# Patient Record
Sex: MALE | Race: WHITE | Employment: FULL TIME | URBAN - METROPOLITAN AREA
[De-identification: names, ages, dates, MRNs, and addresses within clinical notes are randomized per-mention and may not be internally consistent; named-entity substitution may affect disease eponyms.]

---

## 2017-03-09 PROBLEM — E78.2 MIXED HYPERLIPIDEMIA: Chronic | Status: ACTIVE | Noted: 2017-03-09

## 2017-03-09 PROBLEM — I10 ESSENTIAL HYPERTENSION: Chronic | Status: ACTIVE | Noted: 2017-03-09

## 2017-03-09 PROBLEM — E11.9 TYPE 2 DIABETES MELLITUS WITHOUT COMPLICATION, WITHOUT LONG-TERM CURRENT USE OF INSULIN (HCC): Status: ACTIVE | Noted: 2017-03-09

## 2018-03-09 LAB — DIABETIC RETINOPATHY: NORMAL

## 2018-05-08 ENCOUNTER — OFFICE VISIT (OUTPATIENT)
Dept: FAMILY MEDICINE CLINIC | Age: 63
End: 2018-05-08
Payer: COMMERCIAL

## 2018-05-08 VITALS
WEIGHT: 173 LBS | DIASTOLIC BLOOD PRESSURE: 73 MMHG | RESPIRATION RATE: 16 BRPM | HEART RATE: 73 BPM | BODY MASS INDEX: 24.77 KG/M2 | HEIGHT: 70 IN | SYSTOLIC BLOOD PRESSURE: 133 MMHG

## 2018-05-08 DIAGNOSIS — I15.9 SECONDARY HYPERTENSION: ICD-10-CM

## 2018-05-08 DIAGNOSIS — Z86.79 HISTORY OF CARDIOMYOPATHY: ICD-10-CM

## 2018-05-08 DIAGNOSIS — I10 ESSENTIAL HYPERTENSION: Chronic | ICD-10-CM

## 2018-05-08 DIAGNOSIS — E11.9 TYPE 2 DIABETES MELLITUS WITHOUT COMPLICATION, WITHOUT LONG-TERM CURRENT USE OF INSULIN (HCC): Primary | ICD-10-CM

## 2018-05-08 DIAGNOSIS — K21.9 GASTROESOPHAGEAL REFLUX DISEASE WITHOUT ESOPHAGITIS: Chronic | ICD-10-CM

## 2018-05-08 DIAGNOSIS — E78.2 MIXED HYPERLIPIDEMIA: Chronic | ICD-10-CM

## 2018-05-08 PROCEDURE — 3046F HEMOGLOBIN A1C LEVEL >9.0%: CPT | Performed by: FAMILY MEDICINE

## 2018-05-08 PROCEDURE — 2022F DILAT RTA XM EVC RTNOPTHY: CPT | Performed by: FAMILY MEDICINE

## 2018-05-08 PROCEDURE — G8420 CALC BMI NORM PARAMETERS: HCPCS | Performed by: FAMILY MEDICINE

## 2018-05-08 PROCEDURE — 3017F COLORECTAL CA SCREEN DOC REV: CPT | Performed by: FAMILY MEDICINE

## 2018-05-08 PROCEDURE — G8427 DOCREV CUR MEDS BY ELIG CLIN: HCPCS | Performed by: FAMILY MEDICINE

## 2018-05-08 PROCEDURE — 99214 OFFICE O/P EST MOD 30 MIN: CPT | Performed by: FAMILY MEDICINE

## 2018-05-08 PROCEDURE — 1036F TOBACCO NON-USER: CPT | Performed by: FAMILY MEDICINE

## 2018-05-08 RX ORDER — RANITIDINE 300 MG/1
TABLET ORAL
Qty: 30 TABLET | Refills: 5 | Status: SHIPPED | OUTPATIENT
Start: 2018-05-08 | End: 2018-12-11 | Stop reason: SDUPTHER

## 2018-05-08 RX ORDER — CARVEDILOL 12.5 MG/1
TABLET ORAL
Qty: 60 TABLET | Refills: 5 | Status: SHIPPED | OUTPATIENT
Start: 2018-05-08 | End: 2019-03-08

## 2018-05-08 RX ORDER — METFORMIN HYDROCHLORIDE 500 MG/1
TABLET, EXTENDED RELEASE ORAL
Qty: 30 TABLET | Refills: 5 | Status: SHIPPED | OUTPATIENT
Start: 2018-05-08 | End: 2018-11-26

## 2018-05-08 RX ORDER — QUINAPRIL 20 MG/1
TABLET ORAL
Qty: 60 TABLET | Refills: 5 | Status: SHIPPED | OUTPATIENT
Start: 2018-05-08 | End: 2018-11-06

## 2018-05-08 RX ORDER — ATORVASTATIN CALCIUM 40 MG/1
TABLET, FILM COATED ORAL
Qty: 30 TABLET | Refills: 5 | Status: SHIPPED | OUTPATIENT
Start: 2018-05-08 | End: 2019-08-07 | Stop reason: SDUPTHER

## 2018-05-08 ASSESSMENT — PATIENT HEALTH QUESTIONNAIRE - PHQ9
SUM OF ALL RESPONSES TO PHQ9 QUESTIONS 1 & 2: 0
2. FEELING DOWN, DEPRESSED OR HOPELESS: 0
SUM OF ALL RESPONSES TO PHQ QUESTIONS 1-9: 0
1. LITTLE INTEREST OR PLEASURE IN DOING THINGS: 0

## 2018-05-31 LAB
AVERAGE GLUCOSE: NORMAL
CHOLESTEROL, TOTAL: 142 MG/DL
CHOLESTEROL/HDL RATIO: 3.7
CREATININE, URINE: NORMAL
CREATININE: 1.1 MG/DL
HBA1C MFR BLD: 5.6 %
HDLC SERPL-MCNC: 38 MG/DL (ref 35–70)
LDL CHOLESTEROL CALCULATED: 81 MG/DL (ref 0–160)
MICROALBUMIN/CREAT 24H UR: NORMAL MG/G{CREAT}
MICROALBUMIN/CREAT UR-RTO: NORMAL
POTASSIUM (K+): 4.7
TRIGL SERPL-MCNC: 114 MG/DL
VLDLC SERPL CALC-MCNC: NORMAL MG/DL

## 2018-08-28 ENCOUNTER — TELEPHONE (OUTPATIENT)
Dept: FAMILY MEDICINE CLINIC | Age: 63
End: 2018-08-28

## 2018-08-28 NOTE — TELEPHONE ENCOUNTER
<HTML><META HTTP-EQUIV=\"content-type\" CONTENT=\"text/html;charset=utf-8\"><P>Ask your doctor send your prescription to [facility]:<    By Fax:</B> [phone]</LI><LI><B>By Phone:</B> [phone] *</LI></UL><P>*   Our pharmacists are available Monday through Friday, 8 am to 6 pm. Calls placed on weekends or after business hours will be returned on the next business day.</P><P>You can also mail your prescription to [address] [place] [address], [phone]</P>

## 2018-11-26 DIAGNOSIS — E11.9 TYPE 2 DIABETES MELLITUS WITHOUT COMPLICATION, WITHOUT LONG-TERM CURRENT USE OF INSULIN (HCC): ICD-10-CM

## 2018-11-26 RX ORDER — METFORMIN HYDROCHLORIDE 500 MG/1
TABLET, EXTENDED RELEASE ORAL
Qty: 90 TABLET | Refills: 1 | Status: SHIPPED | OUTPATIENT
Start: 2018-11-26 | End: 2019-05-22 | Stop reason: SDUPTHER

## 2018-12-11 DIAGNOSIS — K21.9 GASTROESOPHAGEAL REFLUX DISEASE WITHOUT ESOPHAGITIS: Chronic | ICD-10-CM

## 2018-12-12 RX ORDER — RANITIDINE 300 MG/1
TABLET ORAL
Qty: 30 TABLET | Refills: 5 | Status: SHIPPED | OUTPATIENT
Start: 2018-12-12 | End: 2019-06-07 | Stop reason: SDUPTHER

## 2019-03-11 ENCOUNTER — TELEPHONE (OUTPATIENT)
Dept: FAMILY MEDICINE CLINIC | Age: 64
End: 2019-03-11

## 2019-03-13 RX ORDER — CARVEDILOL 6.25 MG/1
12.5 TABLET ORAL 2 TIMES DAILY
Qty: 360 TABLET | Refills: 1 | Status: SHIPPED | OUTPATIENT
Start: 2019-03-13 | End: 2019-06-19 | Stop reason: SDUPTHER

## 2019-04-29 ENCOUNTER — TELEPHONE (OUTPATIENT)
Dept: FAMILY MEDICINE CLINIC | Age: 64
End: 2019-04-29

## 2019-04-29 DIAGNOSIS — E78.2 MIXED HYPERLIPIDEMIA: Primary | Chronic | ICD-10-CM

## 2019-04-29 DIAGNOSIS — I10 ESSENTIAL HYPERTENSION: Chronic | ICD-10-CM

## 2019-04-29 DIAGNOSIS — E11.9 TYPE 2 DIABETES MELLITUS WITHOUT COMPLICATION, WITHOUT LONG-TERM CURRENT USE OF INSULIN (HCC): ICD-10-CM

## 2019-05-02 DIAGNOSIS — I15.9 SECONDARY HYPERTENSION: Chronic | ICD-10-CM

## 2019-05-02 LAB
AVERAGE GLUCOSE: NORMAL
CHOLESTEROL, TOTAL: 155 MG/DL
CHOLESTEROL/HDL RATIO: NORMAL
HBA1C MFR BLD: 5.8 %
HDLC SERPL-MCNC: 41 MG/DL (ref 35–70)
LDL CHOLESTEROL CALCULATED: 91 MG/DL (ref 0–160)
TRIGL SERPL-MCNC: 117 MG/DL
VLDLC SERPL CALC-MCNC: NORMAL MG/DL

## 2019-05-02 RX ORDER — QUINAPRIL 20 MG/1
TABLET ORAL
Qty: 180 TABLET | Refills: 1 | Status: SHIPPED | OUTPATIENT
Start: 2019-05-02 | End: 2019-08-28 | Stop reason: SDUPTHER

## 2019-05-06 DIAGNOSIS — I10 ESSENTIAL HYPERTENSION: Chronic | ICD-10-CM

## 2019-05-06 DIAGNOSIS — E78.2 MIXED HYPERLIPIDEMIA: Chronic | ICD-10-CM

## 2019-05-06 DIAGNOSIS — E11.9 TYPE 2 DIABETES MELLITUS WITHOUT COMPLICATION, WITHOUT LONG-TERM CURRENT USE OF INSULIN (HCC): ICD-10-CM

## 2019-05-22 DIAGNOSIS — E11.9 TYPE 2 DIABETES MELLITUS WITHOUT COMPLICATION, WITHOUT LONG-TERM CURRENT USE OF INSULIN (HCC): ICD-10-CM

## 2019-05-22 RX ORDER — METFORMIN HYDROCHLORIDE 500 MG/1
TABLET, EXTENDED RELEASE ORAL
Qty: 90 TABLET | Refills: 1 | Status: SHIPPED | OUTPATIENT
Start: 2019-05-22 | End: 2019-08-28 | Stop reason: SDUPTHER

## 2019-06-07 DIAGNOSIS — K21.9 GASTROESOPHAGEAL REFLUX DISEASE WITHOUT ESOPHAGITIS: Chronic | ICD-10-CM

## 2019-06-07 RX ORDER — RANITIDINE 300 MG/1
TABLET ORAL
Qty: 30 TABLET | Refills: 5 | OUTPATIENT
Start: 2019-06-07

## 2019-06-07 RX ORDER — RANITIDINE 300 MG/1
TABLET ORAL
Qty: 30 TABLET | Refills: 1 | Status: SHIPPED | OUTPATIENT
Start: 2019-06-07 | End: 2019-08-28 | Stop reason: SDUPTHER

## 2019-06-19 RX ORDER — CARVEDILOL 6.25 MG/1
12.5 TABLET ORAL 2 TIMES DAILY
Qty: 10 TABLET | Refills: 1 | Status: SHIPPED | OUTPATIENT
Start: 2019-06-19 | End: 2019-09-20 | Stop reason: SDUPTHER

## 2019-07-30 ENCOUNTER — HOSPITAL ENCOUNTER (OUTPATIENT)
Age: 64
Discharge: HOME | End: 2019-07-30
Payer: COMMERCIAL

## 2019-07-30 ENCOUNTER — OFFICE VISIT (OUTPATIENT)
Dept: FAMILY MEDICINE CLINIC | Age: 64
End: 2019-07-30
Payer: COMMERCIAL

## 2019-07-30 VITALS
WEIGHT: 174 LBS | HEART RATE: 70 BPM | SYSTOLIC BLOOD PRESSURE: 116 MMHG | HEIGHT: 69 IN | RESPIRATION RATE: 18 BRPM | OXYGEN SATURATION: 97 % | BODY MASS INDEX: 25.77 KG/M2 | DIASTOLIC BLOOD PRESSURE: 66 MMHG

## 2019-07-30 DIAGNOSIS — Z12.5 SCREENING FOR PROSTATE CANCER: ICD-10-CM

## 2019-07-30 DIAGNOSIS — E78.2 MIXED HYPERLIPIDEMIA: Chronic | ICD-10-CM

## 2019-07-30 DIAGNOSIS — I10 ESSENTIAL HYPERTENSION: Chronic | ICD-10-CM

## 2019-07-30 DIAGNOSIS — Z86.79 HISTORY OF CARDIOMYOPATHY: ICD-10-CM

## 2019-07-30 DIAGNOSIS — E11.9 TYPE 2 DIABETES MELLITUS WITHOUT COMPLICATION, WITHOUT LONG-TERM CURRENT USE OF INSULIN (HCC): Primary | ICD-10-CM

## 2019-07-30 LAB — PROSTATE SPECIFIC ANTIGEN: 0.96 NG/ML (ref 0–4)

## 2019-07-30 PROCEDURE — 3044F HG A1C LEVEL LT 7.0%: CPT | Performed by: FAMILY MEDICINE

## 2019-07-30 PROCEDURE — G0103 PSA SCREENING: HCPCS

## 2019-07-30 PROCEDURE — 2022F DILAT RTA XM EVC RTNOPTHY: CPT | Performed by: FAMILY MEDICINE

## 2019-07-30 PROCEDURE — G8427 DOCREV CUR MEDS BY ELIG CLIN: HCPCS | Performed by: FAMILY MEDICINE

## 2019-07-30 PROCEDURE — 36415 COLL VENOUS BLD VENIPUNCTURE: CPT

## 2019-07-30 PROCEDURE — 3017F COLORECTAL CA SCREEN DOC REV: CPT | Performed by: FAMILY MEDICINE

## 2019-07-30 PROCEDURE — 1036F TOBACCO NON-USER: CPT | Performed by: FAMILY MEDICINE

## 2019-07-30 PROCEDURE — 99214 OFFICE O/P EST MOD 30 MIN: CPT | Performed by: FAMILY MEDICINE

## 2019-07-30 PROCEDURE — G8417 CALC BMI ABV UP PARAM F/U: HCPCS | Performed by: FAMILY MEDICINE

## 2019-07-30 ASSESSMENT — PATIENT HEALTH QUESTIONNAIRE - PHQ9
SUM OF ALL RESPONSES TO PHQ QUESTIONS 1-9: 0
1. LITTLE INTEREST OR PLEASURE IN DOING THINGS: 0
SUM OF ALL RESPONSES TO PHQ9 QUESTIONS 1 & 2: 0
2. FEELING DOWN, DEPRESSED OR HOPELESS: 0
SUM OF ALL RESPONSES TO PHQ QUESTIONS 1-9: 0

## 2019-07-31 NOTE — RESULT ENCOUNTER NOTE
Call patient  The PSA is normal at 0.9  I don't have old PSA numbers. He probably should follow-up with his urologist who will have the previous tests. Thanks.

## 2019-08-06 ENCOUNTER — PATIENT MESSAGE (OUTPATIENT)
Dept: FAMILY MEDICINE CLINIC | Age: 64
End: 2019-08-06

## 2019-08-06 DIAGNOSIS — E78.2 MIXED HYPERLIPIDEMIA: Chronic | ICD-10-CM

## 2019-08-07 RX ORDER — ATORVASTATIN CALCIUM 20 MG/1
20 TABLET, FILM COATED ORAL DAILY
Qty: 90 TABLET | Refills: 1 | Status: SHIPPED
Start: 2019-08-07 | End: 2020-02-27 | Stop reason: SDUPTHER

## 2019-09-20 RX ORDER — CARVEDILOL 6.25 MG/1
12.5 TABLET ORAL 2 TIMES DAILY
Qty: 360 TABLET | Refills: 1 | Status: SHIPPED
Start: 2019-09-20 | End: 2020-02-27 | Stop reason: SDUPTHER

## 2020-02-27 RX ORDER — CARVEDILOL 6.25 MG/1
12.5 TABLET ORAL 2 TIMES DAILY
Qty: 360 TABLET | Refills: 1 | Status: SHIPPED
Start: 2020-02-27 | End: 2020-07-20 | Stop reason: SDUPTHER

## 2020-02-27 RX ORDER — ATORVASTATIN CALCIUM 20 MG/1
20 TABLET, FILM COATED ORAL DAILY
Qty: 90 TABLET | Refills: 1 | Status: SHIPPED
Start: 2020-02-27 | End: 2020-07-20 | Stop reason: SDUPTHER

## 2020-05-08 RX ORDER — QUINAPRIL 20 MG/1
TABLET ORAL
Qty: 180 TABLET | Refills: 0 | Status: SHIPPED
Start: 2020-05-08 | End: 2020-07-20 | Stop reason: SDUPTHER

## 2020-05-28 RX ORDER — METFORMIN HYDROCHLORIDE 500 MG/1
TABLET, EXTENDED RELEASE ORAL
Qty: 90 TABLET | Refills: 0 | Status: SHIPPED
Start: 2020-05-28 | End: 2020-07-20 | Stop reason: SDUPTHER

## 2020-07-20 ENCOUNTER — HOSPITAL ENCOUNTER (OUTPATIENT)
Age: 65
Discharge: HOME | End: 2020-07-22
Payer: COMMERCIAL

## 2020-07-20 ENCOUNTER — OFFICE VISIT (OUTPATIENT)
Dept: FAMILY MEDICINE CLINIC | Age: 65
End: 2020-07-20
Payer: COMMERCIAL

## 2020-07-20 VITALS
TEMPERATURE: 97.8 F | HEIGHT: 69 IN | SYSTOLIC BLOOD PRESSURE: 142 MMHG | BODY MASS INDEX: 24.88 KG/M2 | HEART RATE: 74 BPM | DIASTOLIC BLOOD PRESSURE: 83 MMHG | RESPIRATION RATE: 18 BRPM | OXYGEN SATURATION: 96 % | WEIGHT: 168 LBS

## 2020-07-20 LAB
ALBUMIN SERPL-MCNC: 4.5 G/DL (ref 3.5–5.2)
ALP BLD-CCNC: 81 U/L (ref 40–129)
ALT SERPL-CCNC: 20 U/L (ref 0–40)
ANION GAP SERPL CALCULATED.3IONS-SCNC: 12 MMOL/L (ref 7–16)
AST SERPL-CCNC: 23 U/L (ref 0–39)
BILIRUB SERPL-MCNC: 0.6 MG/DL (ref 0–1.2)
BUN BLDV-MCNC: 23 MG/DL (ref 8–23)
CALCIUM SERPL-MCNC: 9.6 MG/DL (ref 8.6–10.2)
CHLORIDE BLD-SCNC: 102 MMOL/L (ref 98–107)
CHOLESTEROL, FASTING: 139 MG/DL (ref 0–199)
CO2: 26 MMOL/L (ref 22–29)
CREAT SERPL-MCNC: 1.2 MG/DL (ref 0.7–1.2)
CREATININE URINE POCT: NORMAL
GFR AFRICAN AMERICAN: >60
GFR NON-AFRICAN AMERICAN: >60 ML/MIN/1.73
GLUCOSE BLD-MCNC: 119 MG/DL (ref 74–99)
HBA1C MFR BLD: 6.2 %
HDLC SERPL-MCNC: 44 MG/DL
LDL CHOLESTEROL CALCULATED: 82 MG/DL (ref 0–99)
MICROALBUMIN/CREAT 24H UR: NORMAL MG/G{CREAT}
MICROALBUMIN/CREAT UR-RTO: NORMAL
POTASSIUM SERPL-SCNC: 4.8 MMOL/L (ref 3.5–5)
PROSTATE SPECIFIC ANTIGEN: 1.12 NG/ML (ref 0–4)
SODIUM BLD-SCNC: 140 MMOL/L (ref 132–146)
TOTAL PROTEIN: 7 G/DL (ref 6.4–8.3)
TRIGLYCERIDE, FASTING: 64 MG/DL (ref 0–149)
VLDLC SERPL CALC-MCNC: 13 MG/DL

## 2020-07-20 PROCEDURE — 83036 HEMOGLOBIN GLYCOSYLATED A1C: CPT | Performed by: FAMILY MEDICINE

## 2020-07-20 PROCEDURE — G8420 CALC BMI NORM PARAMETERS: HCPCS | Performed by: FAMILY MEDICINE

## 2020-07-20 PROCEDURE — 1036F TOBACCO NON-USER: CPT | Performed by: FAMILY MEDICINE

## 2020-07-20 PROCEDURE — 36415 COLL VENOUS BLD VENIPUNCTURE: CPT | Performed by: FAMILY MEDICINE

## 2020-07-20 PROCEDURE — 82044 UR ALBUMIN SEMIQUANTITATIVE: CPT | Performed by: FAMILY MEDICINE

## 2020-07-20 PROCEDURE — 99214 OFFICE O/P EST MOD 30 MIN: CPT | Performed by: FAMILY MEDICINE

## 2020-07-20 PROCEDURE — 3017F COLORECTAL CA SCREEN DOC REV: CPT | Performed by: FAMILY MEDICINE

## 2020-07-20 PROCEDURE — G0103 PSA SCREENING: HCPCS

## 2020-07-20 PROCEDURE — 80053 COMPREHEN METABOLIC PANEL: CPT

## 2020-07-20 PROCEDURE — 3044F HG A1C LEVEL LT 7.0%: CPT | Performed by: FAMILY MEDICINE

## 2020-07-20 PROCEDURE — 2022F DILAT RTA XM EVC RTNOPTHY: CPT | Performed by: FAMILY MEDICINE

## 2020-07-20 PROCEDURE — 86703 HIV-1/HIV-2 1 RESULT ANTBDY: CPT

## 2020-07-20 PROCEDURE — 80061 LIPID PANEL: CPT

## 2020-07-20 PROCEDURE — G8427 DOCREV CUR MEDS BY ELIG CLIN: HCPCS | Performed by: FAMILY MEDICINE

## 2020-07-20 RX ORDER — ATORVASTATIN CALCIUM 20 MG/1
20 TABLET, FILM COATED ORAL DAILY
Qty: 90 TABLET | Refills: 1 | Status: SHIPPED
Start: 2020-07-20 | End: 2021-02-19 | Stop reason: SDUPTHER

## 2020-07-20 RX ORDER — QUINAPRIL 20 MG/1
TABLET ORAL
Qty: 180 TABLET | Refills: 1 | Status: SHIPPED
Start: 2020-07-20 | End: 2020-12-30

## 2020-07-20 RX ORDER — MELOXICAM 5 MG/1
5 CAPSULE ORAL DAILY
COMMUNITY
End: 2020-07-20 | Stop reason: SDUPTHER

## 2020-07-20 RX ORDER — MELOXICAM 5 MG/1
5 CAPSULE ORAL DAILY
Qty: 90 CAPSULE | Refills: 1 | Status: SHIPPED
Start: 2020-07-20 | End: 2020-07-30

## 2020-07-20 RX ORDER — CARVEDILOL 6.25 MG/1
12.5 TABLET ORAL 2 TIMES DAILY
Qty: 360 TABLET | Refills: 1 | Status: SHIPPED
Start: 2020-07-20 | End: 2021-02-19 | Stop reason: SDUPTHER

## 2020-07-20 RX ORDER — METFORMIN HYDROCHLORIDE 500 MG/1
TABLET, EXTENDED RELEASE ORAL
Qty: 90 TABLET | Refills: 1 | Status: SHIPPED
Start: 2020-07-20 | End: 2021-02-19 | Stop reason: SDUPTHER

## 2020-07-20 ASSESSMENT — PATIENT HEALTH QUESTIONNAIRE - PHQ9
SUM OF ALL RESPONSES TO PHQ QUESTIONS 1-9: 0
SUM OF ALL RESPONSES TO PHQ QUESTIONS 1-9: 0
2. FEELING DOWN, DEPRESSED OR HOPELESS: 0
1. LITTLE INTEREST OR PLEASURE IN DOING THINGS: 0
SUM OF ALL RESPONSES TO PHQ9 QUESTIONS 1 & 2: 0

## 2020-07-20 ASSESSMENT — ENCOUNTER SYMPTOMS
ABDOMINAL PAIN: 0
CHEST TIGHTNESS: 0
SHORTNESS OF BREATH: 0

## 2020-07-20 NOTE — PROGRESS NOTES
[facility]  Family Medicine Residency Program  Phone: [phone]  Fax: [phone]    Patient:  Anette Cooper 62 y.o. male                                 Date of Service: 7/20/20                            Chiefcomplaint:   Chief Complaint   Patient presents with   Leticia Brennan New Doctor    Diabetes         History of Present Illness: The patient is a 62 y.o. male  presented to the clinic with complaints as above. HTN - No issues with pills. No Cv sx. No Cp/SOB/claudication. HPL - no Cv sx. Tolerating lipitor    DM - Checks GLC occasionally. A1C controlled 6.2. Does watch diet. NO issues with metformin    R shoulder rotator cuff pains - taking meloxicam 5mg for this and it controls the pains, no stomach irritation. Review of Systems:   Review of Systems   Respiratory: Negative for chest tightness and shortness of breath. Cardiovascular: Negative for chest pain and palpitations. Gastrointestinal: Negative for abdominal pain. Past Medical History:      Diagnosis Date    Diabetes mellitus, type 2 ([address] 3/14/2015    Diagnosed 3/2015    ED (erectile dysfunction)     GERD (gastroesophageal reflux disease) 3/14/2014    History of alcohol abuse     History of alcoholic cardiomyopathy 4/6740    History of tobacco abuse     Hyperlipidemia     Hypertension     IGT (impaired glucose tolerance) 3/13/2015    Mitral valve regurgitation     needs SBE prophylaxis    PAD (peripheral artery disease) ([facility]) 3/14/2015    Decreased pedal pulses    Tendonitis of shoulder, left        Past Surgical History:        Procedure Laterality Date    COLONOSCOPY  3/1/2008     normal    COLONOSCOPY  05/2019    one small hyperplastic polyp. Dr. England Prophet Left     ankle has 2 screws       Allergies:    Patient has no known allergies.     Social History:   Social History     Socioeconomic History    Marital status:      Spouse name: Not on file    Number of children: Not on file    Years of education: Not on file    Highest education level: Not on file   Occupational History    Not on file   Social Needs    Financial resource strain: Not on file    Food insecurity     Worry: Not on file     Inability: Not on file    Transportation needs     Medical: Not on file     Non-medical: Not on file   Tobacco Use    Smoking status: Former Smoker     Packs/day: 1.50     Years: 15.00     Pack years: 22.50     Last attempt to quit: 2001     Years since quittin.5    Smokeless tobacco: Never Used   Substance and Sexual Activity    Alcohol use: Not Currently    Drug use: No    Sexual activity: Yes     Partners: Female   Lifestyle    Physical activity     Days per week: Not on file     Minutes per session: Not on file    Stress: Not on file   Relationships    Social connections     Talks on phone: Not on file     Gets together: Not on file     Attends Yazdanism service: Not on file     Active member of club or organization: Not on file     Attends meetings of clubs or organizations: Not on file     Relationship status: Not on file    Intimate partner violence     Fear of current or ex partner: Not on file     Emotionally abused: Not on file     Physically abused: Not on file     Forced sexual activity: Not on file   Other Topics Concern    Not on file   Social History Narrative    Not on file        Family History:       Problem Relation Age of Onset    Other Other         Diabetes        Physical Exam:    Vitals: BP (!) 142/83   Pulse 74   Temp 97.8 °F (36.6 °C)   Resp 18   Ht 5' 9\" (1.753 m)   Wt 168 lb (76.2 kg)   SpO2 96%   BMI 24.81 kg/m²   BP Readings from Last 3 Encounters:   20 (!) 142/83   19 116/66   18 133/73     General Appearance: Well developed, awake, alert, oriented, no acute distress  Chest wall/Lung: Clear to auscultation bilaterally,  respirations unlabored.  No ronchi/wheezing/rales  Heart[de-identified] Regular rate and rhythm, S1and S2 normal, no murmur, rub or gallop. Abdomen: Soft, non-tender, bowel sounds normoactive, no masses, no organomegaly  Extremities:  Extremities normal, atraumatic, no cyanosis. +0 edema. Musculokeletal: R shoulder painful arc  Foot Exam: b/l feet: No sores visible. Sensation intact to monofilament. Assessment and Plan:         1. Type 2 diabetes mellitus without complication, without long-term current use of insulin (HCC)  Stable A1C - controlled cont current meds  - POCT glycosylated hemoglobin (Hb A1C)  - POCT microalbumin  - HM DIABETES FOOT EXAM  - metFORMIN (GLUCOPHAGE-XR) 500 MG extended release tablet; Take 1 tablet by mouth one time daily  Dispense: 90 tablet; Refill: 1    2. Essential hypertension  Borderline - will follow for now  - COMPREHENSIVE METABOLIC PANEL; Future  - carvedilol (COREG) 6.25 MG tablet; Take 2 tablets by mouth 2 times daily  Dispense: 360 tablet; Refill: 1    3. Mixed hyperlipidemia  Check labs. - Lipid, Fasting; Future  - atorvastatin (LIPITOR) 20 MG tablet; Take 1 tablet by mouth daily TAKE 1 TABLET BY MOUTH ONE TIME A DAY  Dispense: 90 tablet; Refill: 1    4. Gastroesophageal reflux disease without esophagitis  Stable - no sx at this time. 5. Encounter for screening for HIV  - HIV-1 AND HIV-2 ANTIBODIES; Future    6. Screening for prostate cancer  - PSA SCREENING; Future            Return to Office: Return in about 6 months (around 1/20/2021) for High Blood Pressure, Diabetes. I encourage further reading and education about your health conditions. Information on many healthconditions is provided by the American Academy of Family Physicians: [email or web address]. org/  Please bring any questions to me at your next visit. This document may have been prepared at least partiallythrough the use of voice recognition software.  Although effort is taken to assure the accuracy of this document, it is possible that grammatical, syntax,  or spelling errors may occur. Medication List:    Current Outpatient Medications   Medication Sig Dispense Refill    Meloxicam 5 MG CAPS Take 5 mg by mouth daily 90 capsule 1    metFORMIN (GLUCOPHAGE-XR) 500 MG extended release tablet Take 1 tablet by mouth one time daily 90 tablet 1    quinapril (ACCUPRIL) 20 MG tablet One tab twice daily 180 tablet 1    atorvastatin (LIPITOR) 20 MG tablet Take 1 tablet by mouth daily TAKE 1 TABLET BY MOUTH ONE TIME A DAY 90 tablet 1    carvedilol (COREG) 6.25 MG tablet Take 2 tablets by mouth 2 times daily 360 tablet 1    aspirin 81 MG tablet Take 81 mg by mouth daily. No current facility-administered medications for this visit.          Leonid Ferreira MD

## 2020-07-21 LAB — HIV-1 AND HIV-2 ANTIBODIES: NORMAL

## 2020-07-28 ENCOUNTER — PATIENT MESSAGE (OUTPATIENT)
Dept: FAMILY MEDICINE CLINIC | Age: 65
End: 2020-07-28

## 2020-07-29 NOTE — TELEPHONE ENCOUNTER
From: Leonard Cooper  To: Anthony Call MD  Sent: 2020 6:28 PM EDT  Subject: Prescription Question    Hi,  When I was in for my radha. Dr Kurtis Daniel said that he would reorder the meloxicam 7.5 mg. along with the rest of my medications. [facility] sent me this: Dear Customer,    Thank you for your order. Since we have filled this for you before and the script was , we tried to contact your provider for a refill authorization. However, your provider denied the refill request.    They stated that you will need labs before they will refill the prescription. If you have any questions or concerns, please let us know. Thank you for your business and we look forward to helping you. Will the  be able to rewright perscription and send to [facility] or will i have to have the  that Monroe Spaulding wrote it rewriteit?     Thanks Kimberlee Meek

## 2020-07-29 NOTE — TELEPHONE ENCOUNTER
Please review - RX that was sent over on 7/20 was for 5mg caps and pt is requesting the 7.5mg    Thanks

## 2020-07-29 NOTE — TELEPHONE ENCOUNTER
The script he had from this office in the past was 5mg. I can write for the 7.5, but I want him to realize this is a dose increase from his prior script. He could have had this dose from a specialist office in the past.    Please confirm with the patient which dose he wants and pend.   Thanks,

## 2020-07-30 RX ORDER — MELOXICAM 7.5 MG/1
7.5 TABLET ORAL DAILY
Qty: 90 TABLET | Refills: 1 | Status: SHIPPED
Start: 2020-07-30 | End: 2021-01-22 | Stop reason: SDUPTHER

## 2020-12-30 RX ORDER — QUINAPRIL 20 MG/1
TABLET ORAL
Qty: 180 TABLET | Refills: 0 | Status: SHIPPED
Start: 2020-12-30 | End: 2021-04-30 | Stop reason: SDUPTHER

## 2021-01-21 NOTE — TELEPHONE ENCOUNTER
Last Appointment   7/20/2020  Next Appointment  Visit date not found    Advised pharmacy pt needs appt- have tried to contact pt for appt

## 2021-01-22 RX ORDER — MELOXICAM 7.5 MG/1
7.5 TABLET ORAL DAILY
Qty: 90 TABLET | Refills: 0 | Status: SHIPPED
Start: 2021-01-22 | End: 2021-05-10 | Stop reason: SDUPTHER

## 2021-02-19 DIAGNOSIS — I10 ESSENTIAL HYPERTENSION: ICD-10-CM

## 2021-02-19 DIAGNOSIS — E11.9 TYPE 2 DIABETES MELLITUS WITHOUT COMPLICATION, WITHOUT LONG-TERM CURRENT USE OF INSULIN (HCC): ICD-10-CM

## 2021-02-19 DIAGNOSIS — E78.2 MIXED HYPERLIPIDEMIA: ICD-10-CM

## 2021-02-19 RX ORDER — METFORMIN HYDROCHLORIDE 500 MG/1
TABLET, EXTENDED RELEASE ORAL
Qty: 90 TABLET | Refills: 1 | Status: SHIPPED
Start: 2021-02-19 | End: 2021-05-10 | Stop reason: SDUPTHER

## 2021-02-19 RX ORDER — ATORVASTATIN CALCIUM 20 MG/1
20 TABLET, FILM COATED ORAL DAILY
Qty: 90 TABLET | Refills: 1 | Status: SHIPPED
Start: 2021-02-19 | End: 2021-05-10 | Stop reason: SDUPTHER

## 2021-02-19 RX ORDER — CARVEDILOL 6.25 MG/1
12.5 TABLET ORAL 2 TIMES DAILY
Qty: 360 TABLET | Refills: 1 | Status: SHIPPED
Start: 2021-02-19 | End: 2021-05-10 | Stop reason: SDUPTHER

## 2021-04-16 RX ORDER — MELOXICAM 7.5 MG/1
7.5 TABLET ORAL DAILY
Qty: 90 TABLET | Refills: 0 | OUTPATIENT
Start: 2021-04-16

## 2021-04-29 DIAGNOSIS — I10 ESSENTIAL HYPERTENSION: ICD-10-CM

## 2021-04-30 LAB
ALBUMIN SERPL-MCNC: NORMAL G/DL
ALP BLD-CCNC: NORMAL U/L
ALT SERPL-CCNC: NORMAL U/L
ANION GAP SERPL CALCULATED.3IONS-SCNC: NORMAL MMOL/L
AST SERPL-CCNC: NORMAL U/L
AVERAGE GLUCOSE: 114
BILIRUB SERPL-MCNC: NORMAL MG/DL
BUN BLDV-MCNC: NORMAL MG/DL
CALCIUM SERPL-MCNC: NORMAL MG/DL
CHLORIDE BLD-SCNC: NORMAL MMOL/L
CHOLESTEROL, TOTAL: 136 MG/DL
CHOLESTEROL/HDL RATIO: 3.8
CO2: NORMAL
CREAT SERPL-MCNC: NORMAL MG/DL
GFR CALCULATED: NORMAL
GLUCOSE BLD-MCNC: NORMAL MG/DL
HBA1C MFR BLD: 5.6 %
HDLC SERPL-MCNC: 36 MG/DL (ref 35–70)
LDL CHOLESTEROL CALCULATED: 84 MG/DL (ref 0–160)
NONHDLC SERPL-MCNC: NORMAL MG/DL
POTASSIUM SERPL-SCNC: NORMAL MMOL/L
SODIUM BLD-SCNC: NORMAL MMOL/L
TOTAL PROTEIN: NORMAL
TRIGL SERPL-MCNC: 79 MG/DL
VLDLC SERPL CALC-MCNC: NORMAL MG/DL

## 2021-04-30 RX ORDER — QUINAPRIL 20 MG/1
TABLET ORAL
Qty: 180 TABLET | Refills: 0 | Status: SHIPPED
Start: 2021-04-30 | End: 2021-05-10 | Stop reason: SDUPTHER

## 2021-05-10 ENCOUNTER — OFFICE VISIT (OUTPATIENT)
Dept: FAMILY MEDICINE CLINIC | Age: 66
End: 2021-05-10
Payer: COMMERCIAL

## 2021-05-10 VITALS
RESPIRATION RATE: 16 BRPM | HEIGHT: 69 IN | HEART RATE: 74 BPM | DIASTOLIC BLOOD PRESSURE: 80 MMHG | SYSTOLIC BLOOD PRESSURE: 136 MMHG | OXYGEN SATURATION: 98 % | WEIGHT: 167 LBS | TEMPERATURE: 97.2 F | BODY MASS INDEX: 24.73 KG/M2

## 2021-05-10 DIAGNOSIS — E11.9 TYPE 2 DIABETES MELLITUS WITHOUT COMPLICATION, WITHOUT LONG-TERM CURRENT USE OF INSULIN (HCC): ICD-10-CM

## 2021-05-10 DIAGNOSIS — E78.2 MIXED HYPERLIPIDEMIA: ICD-10-CM

## 2021-05-10 DIAGNOSIS — I10 ESSENTIAL HYPERTENSION: ICD-10-CM

## 2021-05-10 PROCEDURE — 1036F TOBACCO NON-USER: CPT | Performed by: FAMILY MEDICINE

## 2021-05-10 PROCEDURE — 3017F COLORECTAL CA SCREEN DOC REV: CPT | Performed by: FAMILY MEDICINE

## 2021-05-10 PROCEDURE — 99214 OFFICE O/P EST MOD 30 MIN: CPT | Performed by: FAMILY MEDICINE

## 2021-05-10 PROCEDURE — G8420 CALC BMI NORM PARAMETERS: HCPCS | Performed by: FAMILY MEDICINE

## 2021-05-10 PROCEDURE — G8427 DOCREV CUR MEDS BY ELIG CLIN: HCPCS | Performed by: FAMILY MEDICINE

## 2021-05-10 PROCEDURE — 2022F DILAT RTA XM EVC RTNOPTHY: CPT | Performed by: FAMILY MEDICINE

## 2021-05-10 PROCEDURE — 3044F HG A1C LEVEL LT 7.0%: CPT | Performed by: FAMILY MEDICINE

## 2021-05-10 RX ORDER — QUINAPRIL 20 MG/1
TABLET ORAL
Qty: 180 TABLET | Refills: 1 | Status: SHIPPED
Start: 2021-05-10 | End: 2022-01-27 | Stop reason: SDUPTHER

## 2021-05-10 RX ORDER — METFORMIN HYDROCHLORIDE 500 MG/1
TABLET, EXTENDED RELEASE ORAL
Qty: 90 TABLET | Refills: 1 | Status: SHIPPED
Start: 2021-05-10 | End: 2022-01-27 | Stop reason: SDUPTHER

## 2021-05-10 RX ORDER — ATORVASTATIN CALCIUM 20 MG/1
20 TABLET, FILM COATED ORAL DAILY
Qty: 90 TABLET | Refills: 1 | Status: SHIPPED
Start: 2021-05-10 | End: 2022-01-27 | Stop reason: SDUPTHER

## 2021-05-10 RX ORDER — CARVEDILOL 6.25 MG/1
12.5 TABLET ORAL 2 TIMES DAILY
Qty: 360 TABLET | Refills: 1 | Status: SHIPPED
Start: 2021-05-10 | End: 2022-01-27 | Stop reason: SDUPTHER

## 2021-05-10 RX ORDER — MELOXICAM 7.5 MG/1
7.5 TABLET ORAL DAILY
Qty: 90 TABLET | Refills: 1 | Status: SHIPPED
Start: 2021-05-10 | End: 2021-11-12 | Stop reason: SDUPTHER

## 2021-05-10 SDOH — ECONOMIC STABILITY: INCOME INSECURITY: HOW HARD IS IT FOR YOU TO PAY FOR THE VERY BASICS LIKE FOOD, HOUSING, MEDICAL CARE, AND HEATING?: NOT HARD AT ALL

## 2021-05-10 SDOH — ECONOMIC STABILITY: TRANSPORTATION INSECURITY
IN THE PAST 12 MONTHS, HAS LACK OF TRANSPORTATION KEPT YOU FROM MEETINGS, WORK, OR FROM GETTING THINGS NEEDED FOR DAILY LIVING?: NO

## 2021-05-10 ASSESSMENT — PATIENT HEALTH QUESTIONNAIRE - PHQ9
SUM OF ALL RESPONSES TO PHQ9 QUESTIONS 1 & 2: 0
SUM OF ALL RESPONSES TO PHQ QUESTIONS 1-9: 0
SUM OF ALL RESPONSES TO PHQ QUESTIONS 1-9: 0
1. LITTLE INTEREST OR PLEASURE IN DOING THINGS: 0

## 2021-05-10 NOTE — PROGRESS NOTES
[facility]  Family Medicine Residency Program  Phone: [phone]  Fax: [phone]    Patient:  Justine Cooper 60 y.o. male                                 Date of Service: 5/10/21                            Chiefcomplaint:   Chief Complaint   Patient presents with    Diabetes     5/3/2021 Labs done/ hga1c 5.6    Hypertension    Hyperlipidemia    Gastroesophageal Reflux    Health Maintenance     DM eye: patient will schedule; Shingles-pt will go to pharmacy          History of Present Illness: The patient is a 60 y.o. male  presented to the clinic with complaints as above. DM - A1C now 5.6, diet/exercise controlled with metformin. No SE.      HTN - Controlled, no CV sx. Vishal Meds. HPL - Vishal meds, no SE to med.s     Shoulder pains - has been using meloxicam for shoulder. Keeps the pains better controlled. No GI complaints, 2x per month but also diet dependent. No blood in stool. No melena. Overall controlled with current therapy     Review of Systems:   Review of Systems   Respiratory: Negative for chest tightness and shortness of breath. Cardiovascular: Negative for chest pain and palpitations. Gastrointestinal: Negative for abdominal pain. Past Medical History:      Diagnosis Date    Diabetes mellitus, type 2 ([address] 3/14/2015    Diagnosed 3/2015    ED (erectile dysfunction)     GERD (gastroesophageal reflux disease) 3/14/2014    History of alcohol abuse     History of alcoholic cardiomyopathy 0/1721    History of tobacco abuse     Hyperlipidemia     Hypertension     IGT (impaired glucose tolerance) 3/13/2015    Mitral valve regurgitation     needs SBE prophylaxis    [facility]AD [facility]peripheral [facility] disease) ([facility] 3/14/2015    Decreased pedal pulses    Tendonitis of shoulder, left        Past Surgical History:        Procedure Laterality Date    COLONOSCOPY  3/1/2008     normal    COLONOSCOPY  05/2019    one small hyperplastic polyp.  Dr. Muna Ramires  FRACTURE SURGERY Left     ankle has 2 screws       Allergies:    Patient has no known allergies.     Social History:   Social History     Socioeconomic History    Marital status:      Spouse name: Not on file    Number of children: Not on file    Years of education: Not on file    Highest education level: Not on file   Occupational History    Not on file   Social Needs    Financial resource strain: Not hard at all   [place] insecurity     Worry: Never true     Inability: Never true   [employer] needs     Medical: No     Non-medical: No   Tobacco Use    Smoking status: Former Smoker     Packs/day: 1.50     Years: 15.00     Pack years: 22.50     Quit date: 2001     Years since quittin.3    Smokeless tobacco: Never Used   Substance and Sexual Activity    Alcohol use: Not Currently    Drug use: No    Sexual activity: Yes     Partners: Female   Lifestyle    Physical activity     Days per week: Not on file     Minutes per session: Not on file    Stress: Not on file   Relationships    Social connections     Talks on phone: Not on file     Gets together: Not on file     Attends Uatsdin service: Not on file     Active member of club or organization: Not on file     Attends meetings of clubs or organizations: Not on file     Relationship status: Not on file    Intimate partner violence     Fear of current or ex partner: Not on file     Emotionally abused: Not on file     Physically abused: Not on file     Forced sexual activity: Not on file   Other Topics Concern    Not on file   Social History Narrative    Not on file        Family History:       Problem Relation Age of Onset    Other Other         Diabetes        Physical Exam:    Vitals: /80   Pulse 74   Temp 97.2 °F (36.2 °C) (Temporal)   Resp 16   Ht 5' 9\" (1.753 m)   Wt 167 lb (75.8 kg)   SpO2 98%   BMI 24.66 kg/m²   BP Readings from Last 3 Encounters:   05/10/21 136/80   20 (!) 142/83   19 116/66 General Appearance: Well developed, awake, alert, oriented, no acute distress  Chest wall/Lung: Clear to auscultation bilaterally,  respirations unlabored. No ronchi/wheezing/rales  Heart[de-identified]  Regular rate and rhythm, S1and S2 normal, no murmur, rub or gallop. Abdomen: Soft, non-tender, bowel sounds normoactive, no masses, no organomegaly  Extremities:  Extremities normal, atraumatic, no cyanosis. +0 edema. Assessment and Plan:         1. Essential hypertension  Stable refill meds   - quinapril (ACCUPRIL) 20 MG tablet; Take 1 tablet by mouth twice a day  Dispense: 180 tablet; Refill: 1  - carvedilol (COREG) 6.25 MG tablet; Take 2 tablets by mouth 2 times daily  Dispense: 360 tablet; Refill: 1    2. Type 2 diabetes mellitus without complication, without long-term current use of insulin (HCC)  Stable cont meds   - metFORMIN (GLUCOPHAGE-XR) 500 MG extended release tablet; Take 1 tablet by mouth one time daily  Dispense: 90 tablet; Refill: 1    3. Mixed hyperlipidemia  Stable cont meds   - atorvastatin (LIPITOR) 20 MG tablet; Take 1 tablet by mouth daily TAKE 1 TABLET BY MOUTH ONE TIME A DAY  Dispense: 90 tablet; Refill: 1        Return to Office: Return in about 6 months (around 11/10/2021) for AWV - Dm/HTN. I encourage further reading and education about your health conditions. Information on many healthconditions is provided by the American Academy of Family Physicians: https://familydoctor. org/  Please bring any questions to me at your next visit. This document may have been prepared at least partiallythrough the use of voice recognition software. Although effort is taken to assure the accuracy of this document, it is possible that grammatical, syntax,  or spelling errors may occur.     Medication List:    Current Outpatient Medications   Medication Sig Dispense Refill    quinapril (ACCUPRIL) 20 MG tablet Take 1 tablet by mouth twice a day 180 tablet 1    meloxicam (MOBIC) 7.5 MG tablet Take 1 tablet by mouth daily 90 tablet 1    metFORMIN (GLUCOPHAGE-XR) 500 MG extended release tablet Take 1 tablet by mouth one time daily 90 tablet 1    atorvastatin (LIPITOR) 20 MG tablet Take 1 tablet by mouth daily TAKE 1 TABLET BY MOUTH ONE TIME A DAY 90 tablet 1    carvedilol (COREG) 6.25 MG tablet Take 2 tablets by mouth 2 times daily 360 tablet 1    aspirin 81 MG tablet Take 81 mg by mouth daily. No current facility-administered medications for this visit.          Kurt Lozano MD

## 2021-05-14 ASSESSMENT — ENCOUNTER SYMPTOMS
ABDOMINAL PAIN: 0
SHORTNESS OF BREATH: 0
CHEST TIGHTNESS: 0

## 2021-11-12 RX ORDER — MELOXICAM 7.5 MG/1
7.5 TABLET ORAL DAILY
Qty: 90 TABLET | Refills: 1 | Status: SHIPPED
Start: 2021-11-12 | End: 2022-03-18 | Stop reason: SDUPTHER

## 2022-03-11 LAB
ALBUMIN SERPL-MCNC: NORMAL G/DL
ALP BLD-CCNC: NORMAL U/L
ALT SERPL-CCNC: NORMAL U/L
ANION GAP SERPL CALCULATED.3IONS-SCNC: NORMAL MMOL/L
AST SERPL-CCNC: NORMAL U/L
AVERAGE GLUCOSE: NORMAL
BILIRUB SERPL-MCNC: NORMAL MG/DL
BUN BLDV-MCNC: NORMAL MG/DL
CALCIUM SERPL-MCNC: NORMAL MG/DL
CHLORIDE BLD-SCNC: NORMAL MMOL/L
CHOLESTEROL, FASTING: 156
CO2: NORMAL
CREAT SERPL-MCNC: 1.3 MG/DL
CREATININE, URINE: 171.3
GFR CALCULATED: NORMAL
GLUCOSE BLD-MCNC: NORMAL MG/DL
HBA1C MFR BLD: 5.8 %
HDLC SERPL-MCNC: 46 MG/DL (ref 35–70)
LDL CHOLESTEROL CALCULATED: 95 MG/DL (ref 0–160)
MICROALBUMIN/CREAT 24H UR: 1.9 MG/G{CREAT}
MICROALBUMIN/CREAT UR-RTO: 11.1
POTASSIUM SERPL-SCNC: 4.2 MMOL/L
PROSTATE SPECIFIC ANTIGEN: 1.03 NG/ML
SODIUM BLD-SCNC: NORMAL MMOL/L
TOTAL PROTEIN: NORMAL
TRIGLYCERIDE, FASTING: 73

## 2022-03-14 DIAGNOSIS — E78.2 MIXED HYPERLIPIDEMIA: ICD-10-CM

## 2022-03-14 DIAGNOSIS — I10 ESSENTIAL HYPERTENSION: ICD-10-CM

## 2022-03-14 DIAGNOSIS — Z12.5 SCREENING FOR MALIGNANT NEOPLASM OF PROSTATE: ICD-10-CM

## 2022-03-14 DIAGNOSIS — E11.9 TYPE 2 DIABETES MELLITUS WITHOUT COMPLICATION, WITHOUT LONG-TERM CURRENT USE OF INSULIN (HCC): ICD-10-CM

## 2022-03-18 ENCOUNTER — OFFICE VISIT (OUTPATIENT)
Dept: FAMILY MEDICINE CLINIC | Age: 67
End: 2022-03-18
Payer: COMMERCIAL

## 2022-03-18 VITALS
DIASTOLIC BLOOD PRESSURE: 80 MMHG | TEMPERATURE: 96.7 F | SYSTOLIC BLOOD PRESSURE: 128 MMHG | HEIGHT: 70 IN | BODY MASS INDEX: 23.91 KG/M2 | OXYGEN SATURATION: 96 % | WEIGHT: 167 LBS | HEART RATE: 73 BPM

## 2022-03-18 DIAGNOSIS — E78.2 MIXED HYPERLIPIDEMIA: ICD-10-CM

## 2022-03-18 DIAGNOSIS — E11.9 TYPE 2 DIABETES MELLITUS WITHOUT COMPLICATION, WITHOUT LONG-TERM CURRENT USE OF INSULIN (HCC): ICD-10-CM

## 2022-03-18 DIAGNOSIS — Z23 NEED FOR PNEUMOCOCCAL VACCINATION: ICD-10-CM

## 2022-03-18 DIAGNOSIS — Z13.6 SCREENING FOR AAA (ABDOMINAL AORTIC ANEURYSM): Primary | ICD-10-CM

## 2022-03-18 DIAGNOSIS — I10 ESSENTIAL HYPERTENSION: ICD-10-CM

## 2022-03-18 PROCEDURE — G8484 FLU IMMUNIZE NO ADMIN: HCPCS | Performed by: FAMILY MEDICINE

## 2022-03-18 PROCEDURE — 3017F COLORECTAL CA SCREEN DOC REV: CPT | Performed by: FAMILY MEDICINE

## 2022-03-18 PROCEDURE — G8427 DOCREV CUR MEDS BY ELIG CLIN: HCPCS | Performed by: FAMILY MEDICINE

## 2022-03-18 PROCEDURE — 99214 OFFICE O/P EST MOD 30 MIN: CPT | Performed by: FAMILY MEDICINE

## 2022-03-18 PROCEDURE — 90471 IMMUNIZATION ADMIN: CPT | Performed by: FAMILY MEDICINE

## 2022-03-18 PROCEDURE — G8420 CALC BMI NORM PARAMETERS: HCPCS | Performed by: FAMILY MEDICINE

## 2022-03-18 PROCEDURE — 2022F DILAT RTA XM EVC RTNOPTHY: CPT | Performed by: FAMILY MEDICINE

## 2022-03-18 PROCEDURE — 3044F HG A1C LEVEL LT 7.0%: CPT | Performed by: FAMILY MEDICINE

## 2022-03-18 PROCEDURE — 90732 PPSV23 VACC 2 YRS+ SUBQ/IM: CPT | Performed by: FAMILY MEDICINE

## 2022-03-18 PROCEDURE — 4040F PNEUMOC VAC/ADMIN/RCVD: CPT | Performed by: FAMILY MEDICINE

## 2022-03-18 PROCEDURE — 1036F TOBACCO NON-USER: CPT | Performed by: FAMILY MEDICINE

## 2022-03-18 PROCEDURE — 1123F ACP DISCUSS/DSCN MKR DOCD: CPT | Performed by: FAMILY MEDICINE

## 2022-03-18 RX ORDER — METFORMIN HYDROCHLORIDE 500 MG/1
TABLET, EXTENDED RELEASE ORAL
Qty: 90 TABLET | Refills: 1 | Status: SHIPPED
Start: 2022-03-18 | End: 2022-09-25 | Stop reason: SDUPTHER

## 2022-03-18 RX ORDER — MELOXICAM 7.5 MG/1
7.5 TABLET ORAL DAILY
Qty: 90 TABLET | Refills: 1 | Status: SHIPPED
Start: 2022-03-18 | End: 2022-09-25 | Stop reason: SDUPTHER

## 2022-03-18 RX ORDER — ATORVASTATIN CALCIUM 20 MG/1
20 TABLET, FILM COATED ORAL DAILY
Qty: 90 TABLET | Refills: 1 | Status: SHIPPED
Start: 2022-03-18 | End: 2022-09-25 | Stop reason: SDUPTHER

## 2022-03-18 RX ORDER — QUINAPRIL 20 MG/1
TABLET ORAL
Qty: 180 TABLET | Refills: 1 | Status: SHIPPED
Start: 2022-03-18 | End: 2022-09-25 | Stop reason: SDUPTHER

## 2022-03-18 RX ORDER — CARVEDILOL 6.25 MG/1
12.5 TABLET ORAL 2 TIMES DAILY
Qty: 360 TABLET | Refills: 1 | Status: SHIPPED
Start: 2022-03-18 | End: 2022-09-25 | Stop reason: SDUPTHER

## 2022-03-18 ASSESSMENT — ENCOUNTER SYMPTOMS
ABDOMINAL PAIN: 0
SHORTNESS OF BREATH: 0
CHEST TIGHTNESS: 0

## 2022-03-18 NOTE — PROGRESS NOTES
[id number] [facility]  Family Medicine Residency  Phone: [phone]  Fax: [phone]    Patient:  Monica Cooper 73 y.o. male                                 Date of Service: 3/18/22                            Chiefcomplaint:   Chief Complaint   Patient presents with    Diabetes     Last A1C 5.8 on 3/11/22. Pt does not check BS at home     Hypertension     Pt BP at home running 120's-130's/80's         History of Present Illness: The patient is a 73 y.o. male  presented to the clinic with complaints as above. DM2 - running normal 110-140. His A1C remains controlled. Not checking regularly. HTN - tolerating meds - no CV sx. No lightheadedness. HPL - tolerating meds - no Cv sx. Review of Systems:   Review of Systems   Respiratory: Negative for chest tightness and shortness of breath. Cardiovascular: Negative for chest pain and palpitations. Gastrointestinal: Negative for abdominal pain. Past Medical History:      Diagnosis Date    Diabetes mellitus, type 2 ([address] 3/14/2015    Diagnosed 3/2015    ED (erectile dysfunction)     GERD (gastroesophageal reflux disease) 3/14/2014    History of alcohol abuse     History of alcoholic cardiomyopathy 2/3512    History of tobacco abuse     Hyperlipidemia     Hypertension     IGT (impaired glucose tolerance) 3/13/2015    Mitral valve regurgitation     needs SBE prophylaxis    PAD [facility]peripheral artery disease) ([facility] 3/14/2015    Decreased pedal pulses    Tendonitis of shoulder, left        Past Surgical History:        Procedure Laterality Date    COLONOSCOPY  3/1/2008     normal    COLONOSCOPY  05/2019    one small hyperplastic polyp. Dr. Kuldip Rangel Left     ankle has 2 screws       Allergies:    Patient has no known allergies.     Social History:   Social History     Socioeconomic History    Marital status:      Spouse name: Not on file    Number of children: Not on file    Years of education: Not on file    Highest education level: Not on file   Occupational History    Not on file   Tobacco Use    Smoking status: Former Smoker     Packs/day: 1.50     Years: 15.00     Pack years: 22.50     Quit date: 2001     Years since quittin.2    Smokeless tobacco: Never Used   Vaping Use    Vaping Use: Never used   Substance and Sexual Activity    Alcohol use: Not Currently    Drug use: No    Sexual activity: Yes     Partners: Female   Other Topics Concern    Not on file   Social History Narrative    Not on file     Social Determinants of Health     Financial Resource Strain: Low Risk     Difficulty of Paying Living Expenses: Not hard at all   Food Insecurity: No Food Insecurity    Worried About [address] in the Last Year: Never true    [address] in the Last Year: Never true   Transportation Needs: No Transportation Needs    Lack of Transportation (Medical): No    Lack of Transportation (Non-Medical):  No   Physical Activity:     Days of Exercise per Week: Not on file    Minutes of Exercise per Session: Not on file   Stress:     Feeling of Stress : Not on file   Social Connections:     Frequency of Communication with Friends and Family: Not on file    Frequency of Social Gatherings with Friends and Family: Not on file    Attends Rastafari Services: Not on file    Active Member of [address] [place] [address] or Organizations: Not on file    Attends Club or Organization Meetings: Not on file    Marital Status: Not on file   Intimate Partner Violence:     Fear of Current or Ex-Partner: Not on file    Emotionally Abused: Not on file    Physically Abused: Not on file    Sexually Abused: Not on file   Housing Stability:     Unable to Pay for Housing in the Last Year: Not on file    Number of [place] in the Last Year: Not on file    Unstable Housing in the Last Year: Not on file        Family History:       Problem Relation Age of Onset    Other Other         Diabetes Physical Exam:    Vitals: /80   Pulse 73   Temp 96.7 °F (35.9 °C) (Infrared)   Ht 5' 10\" (1.778 m)   Wt 167 lb (75.8 kg)   SpO2 96%   BMI 23.96 kg/m²   BP Readings from Last 3 Encounters:   03/18/22 128/80   05/10/21 136/80   07/20/20 (!) 142/83     General Appearance: Well developed, awake, alert, oriented, no acute distress  Chest wall/Lung: Clear to auscultation bilaterally,  respirations unlabored. No ronchi/wheezing/rales  Heart[de-identified]  Regular rate and rhythm, S1and S2 normal, no murmur, rub or gallop. Abdomen: Soft, non-tender, bowel sounds normoactive, no masses, no organomegaly  Extremities:  Extremities normal, atraumatic, no cyanosis. +0 edema. Neurologic:   Alert&Oriented. Normal gait and coordination  No focal neurological deficits appreaciated         Psychiatric: has a normal mood and affect. Behavior is normal.   Foot Exam: b/l feet: No sores visible. Sensation intact to monofilament. Assessment and Plan:         1. Mixed hyperlipidemia  Continue statin  - atorvastatin (LIPITOR) 20 MG tablet; Take 1 tablet by mouth daily TAKE 1 TABLET BY MOUTH ONE TIME A DAY  Dispense: 90 tablet; Refill: 1    2. Essential hypertension  Stable continue meds  - carvedilol (COREG) 6.25 MG tablet; Take 2 tablets by mouth 2 times daily  Dispense: 360 tablet; Refill: 1  - quinapril (ACCUPRIL) 20 MG tablet; Take 1 tablet by mouth twice a day  Dispense: 180 tablet; Refill: 1    3. Type 2 diabetes mellitus without complication, without long-term current use of insulin (HCC)  Stable continue meds  - metFORMIN (GLUCOPHAGE-XR) 500 MG extended release tablet; Take 1 tablet by mouth one time daily  Dispense: 90 tablet; Refill: 1  - HM DIABETES FOOT EXAM    4. Screening for AAA (abdominal aortic aneurysm)  - US SCREENING FOR AAA; Future    5.  Need for pneumococcal vaccination  - PNEUMOVAX 23 subcutaneous/IM (Pneumococcal polysaccharide vaccine 23-valent >= 1yo)        Return to Office: Return in about 6 months (around 9/18/2022) for Diabetes, High Blood Pressure, Cholesterol. I encourage further reading and education about your health conditions. Information on many healthconditions is provided by the American Academy of Family Physicians: https://familydoctor. org/  Please bring any questions to me at your next visit. This document may have been prepared at least partiallythrough the use of voice recognition software. Although effort is taken to assure the accuracy of this document, it is possible that grammatical, syntax,  or spelling errors may occur. Medication List:    Current Outpatient Medications   Medication Sig Dispense Refill    atorvastatin (LIPITOR) 20 MG tablet Take 1 tablet by mouth daily TAKE 1 TABLET BY MOUTH ONE TIME A DAY 90 tablet 1    carvedilol (COREG) 6.25 MG tablet Take 2 tablets by mouth 2 times daily 360 tablet 1    meloxicam (MOBIC) 7.5 MG tablet Take 1 tablet by mouth daily 90 tablet 1    metFORMIN (GLUCOPHAGE-XR) 500 MG extended release tablet Take 1 tablet by mouth one time daily 90 tablet 1    quinapril (ACCUPRIL) 20 MG tablet Take 1 tablet by mouth twice a day 180 tablet 1    aspirin 81 MG tablet Take 81 mg by mouth daily. No current facility-administered medications for this visit.         Dora Peacock MD

## 2022-09-23 ENCOUNTER — OFFICE VISIT (OUTPATIENT)
Dept: FAMILY MEDICINE CLINIC | Age: 67
End: 2022-09-23
Payer: COMMERCIAL

## 2022-09-23 VITALS
DIASTOLIC BLOOD PRESSURE: 73 MMHG | HEART RATE: 78 BPM | BODY MASS INDEX: 25.18 KG/M2 | OXYGEN SATURATION: 97 % | RESPIRATION RATE: 16 BRPM | TEMPERATURE: 97.8 F | SYSTOLIC BLOOD PRESSURE: 127 MMHG | WEIGHT: 170 LBS | HEIGHT: 69 IN

## 2022-09-23 DIAGNOSIS — I73.9 CLAUDICATION (HCC): ICD-10-CM

## 2022-09-23 DIAGNOSIS — E78.2 MIXED HYPERLIPIDEMIA: Primary | ICD-10-CM

## 2022-09-23 DIAGNOSIS — Z87.891 PERSONAL HISTORY OF SMOKING: ICD-10-CM

## 2022-09-23 DIAGNOSIS — Z12.5 SCREENING FOR MALIGNANT NEOPLASM OF PROSTATE: ICD-10-CM

## 2022-09-23 DIAGNOSIS — I10 ESSENTIAL HYPERTENSION: ICD-10-CM

## 2022-09-23 DIAGNOSIS — E11.9 TYPE 2 DIABETES MELLITUS WITHOUT COMPLICATION, WITHOUT LONG-TERM CURRENT USE OF INSULIN (HCC): ICD-10-CM

## 2022-09-23 LAB — HBA1C MFR BLD: 6 %

## 2022-09-23 PROCEDURE — 3017F COLORECTAL CA SCREEN DOC REV: CPT | Performed by: FAMILY MEDICINE

## 2022-09-23 PROCEDURE — 99214 OFFICE O/P EST MOD 30 MIN: CPT | Performed by: FAMILY MEDICINE

## 2022-09-23 PROCEDURE — 1036F TOBACCO NON-USER: CPT | Performed by: FAMILY MEDICINE

## 2022-09-23 PROCEDURE — 1123F ACP DISCUSS/DSCN MKR DOCD: CPT | Performed by: FAMILY MEDICINE

## 2022-09-23 PROCEDURE — G8417 CALC BMI ABV UP PARAM F/U: HCPCS | Performed by: FAMILY MEDICINE

## 2022-09-23 PROCEDURE — G8427 DOCREV CUR MEDS BY ELIG CLIN: HCPCS | Performed by: FAMILY MEDICINE

## 2022-09-23 PROCEDURE — 3044F HG A1C LEVEL LT 7.0%: CPT | Performed by: FAMILY MEDICINE

## 2022-09-23 PROCEDURE — 2022F DILAT RTA XM EVC RTNOPTHY: CPT | Performed by: FAMILY MEDICINE

## 2022-09-23 PROCEDURE — 83036 HEMOGLOBIN GLYCOSYLATED A1C: CPT | Performed by: FAMILY MEDICINE

## 2022-09-23 SDOH — ECONOMIC STABILITY: FOOD INSECURITY: WITHIN THE PAST 12 MONTHS, THE FOOD YOU BOUGHT JUST DIDN'T LAST AND YOU DIDN'T HAVE MONEY TO GET MORE.: NEVER TRUE

## 2022-09-23 SDOH — ECONOMIC STABILITY: FOOD INSECURITY: WITHIN THE PAST 12 MONTHS, YOU WORRIED THAT YOUR FOOD WOULD RUN OUT BEFORE YOU GOT MONEY TO BUY MORE.: NEVER TRUE

## 2022-09-23 ASSESSMENT — PATIENT HEALTH QUESTIONNAIRE - PHQ9
1. LITTLE INTEREST OR PLEASURE IN DOING THINGS: 0
SUM OF ALL RESPONSES TO PHQ QUESTIONS 1-9: 0
SUM OF ALL RESPONSES TO PHQ9 QUESTIONS 1 & 2: 0
2. FEELING DOWN, DEPRESSED OR HOPELESS: 0

## 2022-09-23 ASSESSMENT — SOCIAL DETERMINANTS OF HEALTH (SDOH): HOW HARD IS IT FOR YOU TO PAY FOR THE VERY BASICS LIKE FOOD, HOUSING, MEDICAL CARE, AND HEATING?: NOT HARD AT ALL

## 2022-09-23 NOTE — PROGRESS NOTES
[facility]  Family Medicine Residency  Phone: [phone]  Fax: [phone]    Patient:  Karoline Cooper 78 y.o. male                                 Date of Service: 9/23/22                            Chiefcomplaint:   Chief Complaint   Patient presents with    Diabetes    Hypertension    Health Maintenance     DM eye - will schedule   Flu - declined   Shingles - had both shots in [place] - had both shots in [place]          History of Present Illness: The patient is a 78 y.o. male  presented to the clinic with complaints as above. Dm 2- has not been checknig GLC. Having no Sx. HTN - BP Controlled, not checking at home. NO CV sx to report. HPL - NO CV sx, tolerating meds. PAD - Claudication Left leg. Prior surg on RLE to open blockages. Patient has a vascular surgeon he is seen before this person is outside of her current system, he splits time between [facility] and [place]. Provided referral for him by his request.    Shoulder Pain treated with mobic. No stomach irritation reported. No GI bleeding/melena/hematochezia. He has been using the mobic daily. Review of Systems:   Review of Systems   Respiratory:  Negative for chest tightness and shortness of breath. Cardiovascular:  Negative for chest pain and palpitations. Gastrointestinal:  Negative for abdominal pain. Musculoskeletal:  Positive for arthralgias and gait problem.      Past Medical History:      Diagnosis Date    Diabetes mellitus, type 2 ([facility] [address] 3/14/2015    Diagnosed 3/2015    ED (erectile dysfunction)     GERD (gastroesophageal reflux disease) 3/14/2014    History of alcohol abuse     History of alcoholic cardiomyopathy 2/3001    History of tobacco abuse     Hyperlipidemia     Hypertension     IGT (impaired glucose tolerance) 3/13/2015    Mitral valve regurgitation     needs SBE prophylaxis    PAD (peripheral artery disease) ([facility]) 3/14/2015    Decreased pedal pulses    Tendonitis of shoulder, left        Past Surgical History:        Procedure Laterality Date    COLONOSCOPY  3/1/2008     normal    COLONOSCOPY  2019    one small hyperplastic polyp. Dr. Kuldip Phillips Left     ankle has 2 screws       Allergies:    Patient has no known allergies.     Social History:   Social History     Socioeconomic History    Marital status:      Spouse name: Not on file    Number of children: Not on file    Years of education: Not on file    Highest education level: Not on file   Occupational History    Not on file   Tobacco Use    Smoking status: Former     Packs/day: 1.50     Years: 15.00     Pack years: 22.50     Types: Cigarettes     Quit date: 2001     Years since quittin.7    Smokeless tobacco: Never   Vaping Use    Vaping Use: Never used   Substance and Sexual Activity    Alcohol use: Not Currently    Drug use: No    Sexual activity: Yes     Partners: Female   Other Topics Concern    Not on file   Social History Narrative    Not on file     Social Determinants of Health     Financial Resource Strain: Low Risk     Difficulty of Paying Living Expenses: Not hard at all   Food Insecurity: No Food Insecurity    Worried About Running Out of Food in the Last Year: Never true    Ran Out of Food in the Last Year: Never true   Transportation Needs: Not on file   Physical Activity: Not on file   Stress: Not on file   Social Connections: Not on file   Intimate Partner Violence: Not on file   Housing Stability: Not on file        Family History:       Problem Relation Age of Onset    Other Other         Diabetes        Physical Exam:    Vitals: /73   Pulse 78   Temp 97.8 °F (36.6 °C)   Resp 16   Ht 5' 9\" (1.753 m)   Wt 170 lb (77.1 kg)   SpO2 97%   BMI 25.10 kg/m²   BP Readings from Last 3 Encounters:   22 127/73   22 128/80   05/10/21 136/80     General Appearance: Well developed, awake, alert, oriented, no acute distress  Chest wall/Lung: Clear to auscultation bilaterally,  respirations unlabored. No ronchi/wheezing/rales  Heart[de-identified]  Regular rate and rhythm, S1and S2 normal, no murmur, rub or gallop. Abdomen: Soft, non-tender, bowel sounds normoactive, no masses, no organomegaly  Extremities:  Extremities normal, atraumatic, no cyanosis. +0 edema. Skin: Bilateral lower extremities relatively without hair there is a pulse on the right of +2 pulse on the left of trace posterior tib region  Neurologic:   Alert&Oriented. Normal gait and coordination  No focal neurological deficits appreaciated         Psychiatric: has a normal mood and affect. Behavior is normal.       Assessment and Plan:         1. Mixed hyperlipidemia  Will increase statin to 40 mg I discussed that this may improve the amount of blockage that he creates in the future. - atorvastatin (LIPITOR) 40 MG tablet; Take 1 tablet by mouth daily TAKE 1 TABLET BY MOUTH ONE TIME A DAY  Dispense: 90 tablet; Refill: 3    2. Essential hypertension  Controlled continue carvedilol andquinapril  - carvedilol (COREG) 6.25 MG tablet; Take 2 tablets by mouth 2 times daily  Dispense: 360 tablet; Refill: 3  - quinapril (ACCUPRIL) 20 MG tablet; Take 1 tablet by mouth twice a day  Dispense: 180 tablet; Refill: 3  - Comprehensive Metabolic Panel; Future  - Lipid, Fasting; Future  - TSH; Future  - CBC; Future    3. Type 2 diabetes mellitus without complication, without long-term current use of insulin (HCC)  Continue metformin follow labs  - POCT glycosylated hemoglobin (Hb A1C)  - metFORMIN (GLUCOPHAGE-XR) 500 MG extended release tablet; Take 1 tablet by mouth one time daily  Dispense: 90 tablet; Refill: 3  - Hemoglobin A1C; Future  - MICROALBUMIN / CREATININE URINE RATIO; Future    4. Claudication [facility])  Refer to vascular surgery and we will check ultrasound, patient may need runoff study  - External Referral To Vascular Surgery  - US DUP LOWER EXTREMITY LEFT ARTERIES; Future    5.  Personal history of smoking    6. Screening for malignant neoplasm of prostate  - PSA Screening; Future      Return to Office: Return in about 6 months (around 3/23/2023). I encourage further reading and education about your health conditions. Information on many healthconditions is provided by the American Academy of Family Physicians: https://familydoctor. org/  Please bring any questions to me at your next visit. This document may have been prepared at least partiallythrough the use of voice recognition software. Although effort is taken to assure the accuracy of this document, it is possible that grammatical, syntax,  or spelling errors may occur. Medication List:    Current Outpatient Medications   Medication Sig Dispense Refill    atorvastatin (LIPITOR) 40 MG tablet Take 1 tablet by mouth daily TAKE 1 TABLET BY MOUTH ONE TIME A DAY 90 tablet 3    carvedilol (COREG) 6.25 MG tablet Take 2 tablets by mouth 2 times daily 360 tablet 3    meloxicam (MOBIC) 7.5 MG tablet Take 1 tablet by mouth daily 90 tablet 3    metFORMIN (GLUCOPHAGE-XR) 500 MG extended release tablet Take 1 tablet by mouth one time daily 90 tablet 3    quinapril (ACCUPRIL) 20 MG tablet Take 1 tablet by mouth twice a day 180 tablet 3    aspirin 81 MG tablet Take 81 mg by mouth daily. No current facility-administered medications for this visit.         Lana Daigle MD

## 2022-09-25 RX ORDER — QUINAPRIL 20 MG/1
TABLET ORAL
Qty: 180 TABLET | Refills: 3 | Status: SHIPPED
Start: 2022-09-25 | End: 2022-11-03

## 2022-09-25 RX ORDER — MELOXICAM 7.5 MG/1
7.5 TABLET ORAL DAILY
Qty: 90 TABLET | Refills: 3 | Status: SHIPPED | OUTPATIENT
Start: 2022-09-25

## 2022-09-25 RX ORDER — CARVEDILOL 6.25 MG/1
12.5 TABLET ORAL 2 TIMES DAILY
Qty: 360 TABLET | Refills: 3 | Status: SHIPPED | OUTPATIENT
Start: 2022-09-25

## 2022-09-25 RX ORDER — METFORMIN HYDROCHLORIDE 500 MG/1
TABLET, EXTENDED RELEASE ORAL
Qty: 90 TABLET | Refills: 3 | Status: SHIPPED | OUTPATIENT
Start: 2022-09-25

## 2022-09-25 RX ORDER — ATORVASTATIN CALCIUM 40 MG/1
40 TABLET, FILM COATED ORAL DAILY
Qty: 90 TABLET | Refills: 3 | Status: SHIPPED | OUTPATIENT
Start: 2022-09-25

## 2022-09-25 ASSESSMENT — ENCOUNTER SYMPTOMS
CHEST TIGHTNESS: 0
SHORTNESS OF BREATH: 0
ABDOMINAL PAIN: 0

## 2023-04-03 LAB
ALBUMIN SERPL-MCNC: 3.9 G/DL
ALP BLD-CCNC: 70 U/L
ALT SERPL-CCNC: 26 U/L
ANION GAP SERPL CALCULATED.3IONS-SCNC: 6 MMOL/L
AST SERPL-CCNC: 21 U/L
AVERAGE GLUCOSE: 114
BASOPHILS ABSOLUTE: NORMAL
BASOPHILS RELATIVE PERCENT: NORMAL
BILIRUB SERPL-MCNC: 0.7 MG/DL (ref 0.1–1.4)
BUN BLDV-MCNC: 16 MG/DL
CALCIUM SERPL-MCNC: 9.1 MG/DL
CHLORIDE BLD-SCNC: 107 MMOL/L
CHOLESTEROL, FASTING: 131
CO2: 27 MMOL/L
CREAT SERPL-MCNC: 1 MG/DL
EGFR: >60
EOSINOPHILS ABSOLUTE: NORMAL
EOSINOPHILS RELATIVE PERCENT: NORMAL
GLUCOSE BLD-MCNC: 108 MG/DL
HBA1C MFR BLD: 5.6 %
HCT VFR BLD CALC: 43.9 % (ref 41–53)
HDLC SERPL-MCNC: 40 MG/DL (ref 35–70)
HEMOGLOBIN: 14.7 G/DL (ref 13.5–17.5)
LDL CHOLESTEROL CALCULATED: 75 MG/DL (ref 0–160)
LYMPHOCYTES ABSOLUTE: NORMAL
LYMPHOCYTES RELATIVE PERCENT: NORMAL
MCH RBC QN AUTO: NORMAL PG
MCHC RBC AUTO-ENTMCNC: NORMAL G/DL
MCV RBC AUTO: NORMAL FL
MONOCYTES ABSOLUTE: NORMAL
MONOCYTES RELATIVE PERCENT: NORMAL
NEUTROPHILS ABSOLUTE: NORMAL
NEUTROPHILS RELATIVE PERCENT: NORMAL
PLATELET # BLD: 219 K/ΜL
PMV BLD AUTO: NORMAL FL
POTASSIUM SERPL-SCNC: 4 MMOL/L
PROSTATE SPECIFIC ANTIGEN: 1.2 NG/ML
RBC # BLD: NORMAL 10*6/UL
SODIUM BLD-SCNC: 140 MMOL/L
TOTAL PROTEIN: 6.2
TRIGLYCERIDE, FASTING: 79
TSH SERPL DL<=0.05 MIU/L-ACNC: 1.91 UIU/ML
WBC # BLD: 6.11 10^3/ML

## 2023-04-04 DIAGNOSIS — E11.9 TYPE 2 DIABETES MELLITUS WITHOUT COMPLICATION, WITHOUT LONG-TERM CURRENT USE OF INSULIN (HCC): ICD-10-CM

## 2023-04-04 DIAGNOSIS — I10 ESSENTIAL HYPERTENSION: ICD-10-CM

## 2023-04-04 DIAGNOSIS — Z12.5 SCREENING FOR MALIGNANT NEOPLASM OF PROSTATE: ICD-10-CM

## 2023-04-05 RX ORDER — LISINOPRIL 20 MG/1
20 TABLET ORAL DAILY
Qty: 90 TABLET | Refills: 2 | Status: SHIPPED | OUTPATIENT
Start: 2023-04-05

## 2023-04-05 NOTE — TELEPHONE ENCOUNTER
Last Appointment   9/23/2022  Next Appointment  Visit date not found    Spoke with patient about scheduling, he is going to let me know at the end of the week, but plans on coming in the first week of May. He has some guys off at work and cannot take time off at this time. Will be out of meds before he can make it in.

## 2023-05-15 ENCOUNTER — OFFICE VISIT (OUTPATIENT)
Dept: FAMILY MEDICINE CLINIC | Age: 68
End: 2023-05-15
Payer: COMMERCIAL

## 2023-05-15 VITALS
RESPIRATION RATE: 16 BRPM | OXYGEN SATURATION: 97 % | SYSTOLIC BLOOD PRESSURE: 131 MMHG | TEMPERATURE: 98.5 F | BODY MASS INDEX: 24.88 KG/M2 | HEIGHT: 69 IN | HEART RATE: 75 BPM | DIASTOLIC BLOOD PRESSURE: 67 MMHG | WEIGHT: 168 LBS

## 2023-05-15 DIAGNOSIS — E78.2 MIXED HYPERLIPIDEMIA: Chronic | ICD-10-CM

## 2023-05-15 DIAGNOSIS — Z13.6 SCREENING FOR AAA (ABDOMINAL AORTIC ANEURYSM): Primary | ICD-10-CM

## 2023-05-15 DIAGNOSIS — E11.9 TYPE 2 DIABETES MELLITUS WITHOUT COMPLICATION, WITHOUT LONG-TERM CURRENT USE OF INSULIN (HCC): ICD-10-CM

## 2023-05-15 DIAGNOSIS — I10 ESSENTIAL HYPERTENSION: Chronic | ICD-10-CM

## 2023-05-15 PROCEDURE — G8420 CALC BMI NORM PARAMETERS: HCPCS | Performed by: FAMILY MEDICINE

## 2023-05-15 PROCEDURE — 2022F DILAT RTA XM EVC RTNOPTHY: CPT | Performed by: FAMILY MEDICINE

## 2023-05-15 PROCEDURE — 99214 OFFICE O/P EST MOD 30 MIN: CPT | Performed by: FAMILY MEDICINE

## 2023-05-15 PROCEDURE — 3044F HG A1C LEVEL LT 7.0%: CPT | Performed by: FAMILY MEDICINE

## 2023-05-15 PROCEDURE — 1123F ACP DISCUSS/DSCN MKR DOCD: CPT | Performed by: FAMILY MEDICINE

## 2023-05-15 PROCEDURE — G8427 DOCREV CUR MEDS BY ELIG CLIN: HCPCS | Performed by: FAMILY MEDICINE

## 2023-05-15 PROCEDURE — 1036F TOBACCO NON-USER: CPT | Performed by: FAMILY MEDICINE

## 2023-05-15 PROCEDURE — 3074F SYST BP LT 130 MM HG: CPT | Performed by: FAMILY MEDICINE

## 2023-05-15 PROCEDURE — 3078F DIAST BP <80 MM HG: CPT | Performed by: FAMILY MEDICINE

## 2023-05-15 PROCEDURE — 3017F COLORECTAL CA SCREEN DOC REV: CPT | Performed by: FAMILY MEDICINE

## 2023-05-15 RX ORDER — OMEPRAZOLE 40 MG/1
40 CAPSULE, DELAYED RELEASE ORAL
Qty: 90 CAPSULE | Refills: 3 | Status: SHIPPED | OUTPATIENT
Start: 2023-05-15

## 2023-05-15 RX ORDER — MULTIVIT-MIN/IRON FUM/FOLIC AC 7.5 MG-4
1 TABLET ORAL DAILY
Qty: 1 TABLET | Refills: 0
Start: 2023-05-15

## 2023-05-15 RX ORDER — ATORVASTATIN CALCIUM 40 MG/1
40 TABLET, FILM COATED ORAL DAILY
Qty: 90 TABLET | Refills: 3 | Status: SHIPPED | OUTPATIENT
Start: 2023-05-15

## 2023-05-15 RX ORDER — MELOXICAM 7.5 MG/1
7.5 TABLET ORAL DAILY
Qty: 90 TABLET | Refills: 3 | Status: SHIPPED | OUTPATIENT
Start: 2023-05-15

## 2023-05-15 RX ORDER — CARVEDILOL 6.25 MG/1
12.5 TABLET ORAL 2 TIMES DAILY
Qty: 360 TABLET | Refills: 3 | Status: SHIPPED | OUTPATIENT
Start: 2023-05-15

## 2023-05-15 RX ORDER — LISINOPRIL 20 MG/1
20 TABLET ORAL DAILY
Qty: 90 TABLET | Refills: 3 | Status: SHIPPED | OUTPATIENT
Start: 2023-05-15

## 2023-05-15 RX ORDER — METFORMIN HYDROCHLORIDE 500 MG/1
TABLET, EXTENDED RELEASE ORAL
Qty: 90 TABLET | Refills: 3 | Status: SHIPPED | OUTPATIENT
Start: 2023-05-15

## 2023-05-15 RX ORDER — ASCORBIC ACID 500 MG
500 TABLET ORAL DAILY
Qty: 1 TABLET | Refills: 0
Start: 2023-05-15

## 2023-05-15 SDOH — ECONOMIC STABILITY: FOOD INSECURITY: WITHIN THE PAST 12 MONTHS, THE FOOD YOU BOUGHT JUST DIDN'T LAST AND YOU DIDN'T HAVE MONEY TO GET MORE.: NEVER TRUE

## 2023-05-15 SDOH — ECONOMIC STABILITY: HOUSING INSECURITY
IN THE LAST 12 MONTHS, WAS THERE A TIME WHEN YOU DID NOT HAVE A STEADY PLACE TO SLEEP OR SLEPT IN A SHELTER (INCLUDING NOW)?: NO

## 2023-05-15 SDOH — ECONOMIC STABILITY: INCOME INSECURITY: HOW HARD IS IT FOR YOU TO PAY FOR THE VERY BASICS LIKE FOOD, HOUSING, MEDICAL CARE, AND HEATING?: NOT VERY HARD

## 2023-05-15 SDOH — ECONOMIC STABILITY: FOOD INSECURITY: WITHIN THE PAST 12 MONTHS, YOU WORRIED THAT YOUR FOOD WOULD RUN OUT BEFORE YOU GOT MONEY TO BUY MORE.: NEVER TRUE

## 2023-05-15 ASSESSMENT — PATIENT HEALTH QUESTIONNAIRE - PHQ9
2. FEELING DOWN, DEPRESSED OR HOPELESS: 0
SUM OF ALL RESPONSES TO PHQ9 QUESTIONS 1 & 2: 0
1. LITTLE INTEREST OR PLEASURE IN DOING THINGS: 0
SUM OF ALL RESPONSES TO PHQ QUESTIONS 1-9: 0

## 2023-05-15 ASSESSMENT — ENCOUNTER SYMPTOMS
SHORTNESS OF BREATH: 0
ABDOMINAL PAIN: 0
CHEST TIGHTNESS: 0

## 2023-07-31 ENCOUNTER — PATIENT MESSAGE (OUTPATIENT)
Dept: FAMILY MEDICINE CLINIC | Age: 68
End: 2023-07-31

## 2023-07-31 DIAGNOSIS — R10.2 PELVIC PAIN: Primary | ICD-10-CM

## 2023-07-31 RX ORDER — TAMSULOSIN HYDROCHLORIDE 0.4 MG/1
0.4 CAPSULE ORAL
Qty: 30 CAPSULE | Refills: 0 | Status: SHIPPED | OUTPATIENT
Start: 2023-07-31

## 2023-07-31 NOTE — TELEPHONE ENCOUNTER
From: Dat Cooper  To: Dr. Julian Plunkett  Sent: 7/31/2023 6:36 AM EDT  Subject: Not releasing all my urine    I am having trouble for the past 3 weeks   I have pelvic pain also  I made an appointment to see a urologist here in [address] [place] [address]. it is not until 8/31/23   What do I need to do in the meantime for some type of relief?   Thanks Erik Lang

## 2023-07-31 NOTE — TELEPHONE ENCOUNTER
I phoned patient he reports issues sleeping at night b/c having issues emptying bladder. Having pelvic pains also. I'm going to give a flomax and get at UA and UCx. Paper prescription faxed to his local health system. Please schedule in office when he is back in town.

## 2023-08-01 ENCOUNTER — TELEPHONE (OUTPATIENT)
Dept: FAMILY MEDICINE CLINIC | Age: 68
End: 2023-08-01

## 2023-08-01 DIAGNOSIS — R39.198 DIFFICULTY URINATING: ICD-10-CM

## 2023-08-01 DIAGNOSIS — R10.2 PELVIC PAIN: Primary | ICD-10-CM

## 2023-08-01 LAB — MICROSCOPIC URINE: NORMAL

## 2023-08-01 NOTE — TELEPHONE ENCOUNTER
Sent message asking patient when he will return to town so we can schedule him. He sent another mychart message with multiple other complaints as well. He needs to be seen.

## 2023-08-01 NOTE — TELEPHONE ENCOUNTER
From: Alex Cooper  To: Dr. Wendy Jaimes  Sent: 7/31/2023 4:06 PM EDT  Subject: Sarah Bishop    I am also not wanting to eat low on energy, when I do aet I feel boalted and have a slight pain in my abdomen  just above my prostrate. Food doesn't sound even good  I lost weight I maybe eat 300 calories a day  pain in the lower back also right side  how soon can I maybe get some x rays done or what ever need I don't want to prolong . I schduled a colonostpy 9/15/23 is the date for that.   I need to know what i have going on I am not used to this Thanks Sara Leon

## 2023-08-01 NOTE — TELEPHONE ENCOUNTER
Spoke with the patient tonight. He reports ongoing pelvic pain he picked up the Flomax today he dropped off the urine analysis and urine culture today. I phoned the hospital for results. The urine analysis was dropped off approximately an hour ago and results not available. .  Urine culture is pending. After speaking with the patient were going to go ahead and do some blood work including a CBC and CMP and were going to try to schedule a bladder ultrasound for further evaluation. He is aware that if symptoms become severe he will need to go to the emergency room where he may need a catheter in order to drain his bladder as our current working suspicion is that he is experiencing prostate dysfunction substantial enough that he is having difficulty emptying. Please fax the labs and organize for the ultrasound at [address] [place] [address] [place] [address]  There are lab phone number is [phone]. Their lab fax number is [phone].

## 2023-08-02 DIAGNOSIS — R10.2 PELVIC PAIN: ICD-10-CM

## 2023-08-02 NOTE — TELEPHONE ENCOUNTER
Culture is not back yet but they are sending over UA results and I faxed there labs over as well. I will give them to you when I receive them.

## 2023-08-02 NOTE — TELEPHONE ENCOUNTER
UA is not indicative of infection will not antibiotics at this time. Await culture. Please find out from patient if the Flomax was at all helpful with his symptoms.

## 2023-08-07 ENCOUNTER — TELEPHONE (OUTPATIENT)
Dept: FAMILY MEDICINE CLINIC | Age: 68
End: 2023-08-07

## 2023-08-07 ENCOUNTER — PATIENT MESSAGE (OUTPATIENT)
Dept: FAMILY MEDICINE CLINIC | Age: 68
End: 2023-08-07

## 2023-08-07 NOTE — TELEPHONE ENCOUNTER
Pt is calling to check up on [employer] messages that have been sent over the past month. States he would like to speak with Dr Alejandra Garcia on the phone to see what he would prefer he do next with the current concerns he has. States he is in [place] right now and doesn't know what he should be doing/not doing as far as treatment. Pt states he is in [place] for work and does not know when he will be back in town. Symptoms stated during phone bladder/prostate trouble.  Said Dr Alejandra Garcia knows what is going on because he's discussed this with him in the past     Pt ph: [phone]    Please advise

## 2023-08-07 NOTE — TELEPHONE ENCOUNTER
From: Maria Cooper  To: Dr. Asha Yun  Sent: 8/7/2023 3:02 PM EDT  Subject: Radiology and [address]    Fax # for [employer] [phone] [employer] [facility].  [phone]     I did not see the order    Thanks Abhishek Muir

## 2024-02-29 LAB — DIABETIC RETINOPATHY: NEGATIVE

## 2024-03-25 RX ORDER — OXYBUTYNIN CHLORIDE 5 MG/1
5 TABLET, EXTENDED RELEASE ORAL DAILY
Qty: 30 TABLET | Refills: 0 | Status: SHIPPED | OUTPATIENT
Start: 2024-03-25

## 2024-04-07 ENCOUNTER — PATIENT MESSAGE (OUTPATIENT)
Dept: FAMILY MEDICINE CLINIC | Age: 69
End: 2024-04-07

## 2024-04-07 DIAGNOSIS — I10 ESSENTIAL HYPERTENSION: ICD-10-CM

## 2024-04-07 DIAGNOSIS — E78.2 MIXED HYPERLIPIDEMIA: ICD-10-CM

## 2024-04-07 DIAGNOSIS — E11.9 TYPE 2 DIABETES MELLITUS WITHOUT COMPLICATION, WITHOUT LONG-TERM CURRENT USE OF INSULIN (HCC): ICD-10-CM

## 2024-04-07 DIAGNOSIS — Z13.6 SCREENING FOR AAA (ABDOMINAL AORTIC ANEURYSM): Primary | ICD-10-CM

## 2024-04-07 DIAGNOSIS — Z12.5 SCREENING PSA (PROSTATE SPECIFIC ANTIGEN): ICD-10-CM

## 2024-04-08 NOTE — TELEPHONE ENCOUNTER
Patient requesting AAA order and lab orders be faxed to [place].     AAA pending your review and Signature    Pended some labs as well for you to review.      Thank you.

## 2024-04-08 NOTE — TELEPHONE ENCOUNTER
From: Ortega Cooper  To: Dr. Suleman Pack  Sent: 4/7/2024 6:46 PM EDT  Subject: (AAA) screening and blood work orders    Hi, I will need an order to be sent to [facility] here in [place].to have this test done . Also I will an order for my blood work to be done as well    Thanks Dorian  eye exam should have been sent to from 1 1/2 mo ago  from [place] [facility].

## 2024-04-26 ENCOUNTER — PATIENT MESSAGE (OUTPATIENT)
Dept: FAMILY MEDICINE CLINIC | Age: 69
End: 2024-04-26

## 2024-04-26 NOTE — TELEPHONE ENCOUNTER
From: Ortega Cooper  To: Dr. Suleman Pack  Sent: 4/26/2024 11:19 AM EDT  Subject: AAA SCREENING    Did you get the test results for this screening? Thanks

## 2024-04-29 ENCOUNTER — OFFICE VISIT (OUTPATIENT)
Dept: FAMILY MEDICINE CLINIC | Age: 69
End: 2024-04-29
Payer: COMMERCIAL

## 2024-04-29 VITALS
WEIGHT: 167 LBS | SYSTOLIC BLOOD PRESSURE: 151 MMHG | OXYGEN SATURATION: 97 % | HEIGHT: 69 IN | RESPIRATION RATE: 16 BRPM | BODY MASS INDEX: 24.73 KG/M2 | DIASTOLIC BLOOD PRESSURE: 74 MMHG | HEART RATE: 68 BPM | TEMPERATURE: 98.6 F

## 2024-04-29 DIAGNOSIS — R10.2 PELVIC PAIN: ICD-10-CM

## 2024-04-29 DIAGNOSIS — K21.9 GASTROESOPHAGEAL REFLUX DISEASE WITHOUT ESOPHAGITIS: Primary | ICD-10-CM

## 2024-04-29 DIAGNOSIS — I10 ESSENTIAL HYPERTENSION: Chronic | ICD-10-CM

## 2024-04-29 DIAGNOSIS — E11.9 TYPE 2 DIABETES MELLITUS WITHOUT COMPLICATION, WITHOUT LONG-TERM CURRENT USE OF INSULIN (HCC): ICD-10-CM

## 2024-04-29 DIAGNOSIS — N32.81 OVERACTIVE BLADDER: ICD-10-CM

## 2024-04-29 DIAGNOSIS — E78.2 MIXED HYPERLIPIDEMIA: Chronic | ICD-10-CM

## 2024-04-29 PROCEDURE — 1036F TOBACCO NON-USER: CPT | Performed by: FAMILY MEDICINE

## 2024-04-29 PROCEDURE — 3078F DIAST BP <80 MM HG: CPT | Performed by: FAMILY MEDICINE

## 2024-04-29 PROCEDURE — 2022F DILAT RTA XM EVC RTNOPTHY: CPT | Performed by: FAMILY MEDICINE

## 2024-04-29 PROCEDURE — 99214 OFFICE O/P EST MOD 30 MIN: CPT | Performed by: FAMILY MEDICINE

## 2024-04-29 PROCEDURE — G8427 DOCREV CUR MEDS BY ELIG CLIN: HCPCS | Performed by: FAMILY MEDICINE

## 2024-04-29 PROCEDURE — 1123F ACP DISCUSS/DSCN MKR DOCD: CPT | Performed by: FAMILY MEDICINE

## 2024-04-29 PROCEDURE — 3017F COLORECTAL CA SCREEN DOC REV: CPT | Performed by: FAMILY MEDICINE

## 2024-04-29 PROCEDURE — 3046F HEMOGLOBIN A1C LEVEL >9.0%: CPT | Performed by: FAMILY MEDICINE

## 2024-04-29 PROCEDURE — G8420 CALC BMI NORM PARAMETERS: HCPCS | Performed by: FAMILY MEDICINE

## 2024-04-29 PROCEDURE — 3077F SYST BP >= 140 MM HG: CPT | Performed by: FAMILY MEDICINE

## 2024-04-29 RX ORDER — METFORMIN HYDROCHLORIDE 500 MG/1
TABLET, EXTENDED RELEASE ORAL
Qty: 90 TABLET | Refills: 3 | Status: SHIPPED | OUTPATIENT
Start: 2024-04-29

## 2024-04-29 RX ORDER — MELOXICAM 15 MG/1
15 TABLET ORAL DAILY PRN
Qty: 30 TABLET | Refills: 1 | Status: SHIPPED | OUTPATIENT
Start: 2024-04-29

## 2024-04-29 RX ORDER — CARVEDILOL 6.25 MG/1
12.5 TABLET ORAL 2 TIMES DAILY
Qty: 360 TABLET | Refills: 3 | Status: SHIPPED | OUTPATIENT
Start: 2024-04-29

## 2024-04-29 RX ORDER — MELOXICAM 7.5 MG/1
7.5 TABLET ORAL DAILY
Qty: 90 TABLET | Refills: 3 | Status: SHIPPED | OUTPATIENT
Start: 2024-04-29

## 2024-04-29 RX ORDER — OMEPRAZOLE 40 MG/1
40 CAPSULE, DELAYED RELEASE ORAL
Qty: 90 CAPSULE | Refills: 3 | Status: SHIPPED | OUTPATIENT
Start: 2024-04-29

## 2024-04-29 RX ORDER — ATORVASTATIN CALCIUM 40 MG/1
40 TABLET, FILM COATED ORAL DAILY
Qty: 90 TABLET | Refills: 3 | Status: SHIPPED | OUTPATIENT
Start: 2024-04-29

## 2024-04-29 RX ORDER — OXYBUTYNIN CHLORIDE 5 MG/1
5 TABLET, EXTENDED RELEASE ORAL DAILY
Qty: 90 TABLET | Refills: 3 | Status: SHIPPED | OUTPATIENT
Start: 2024-04-29

## 2024-04-29 RX ORDER — LISINOPRIL 20 MG/1
20 TABLET ORAL DAILY
Qty: 90 TABLET | Refills: 3 | Status: SHIPPED | OUTPATIENT
Start: 2024-04-29

## 2024-04-29 ASSESSMENT — PATIENT HEALTH QUESTIONNAIRE - PHQ9
SUM OF ALL RESPONSES TO PHQ QUESTIONS 1-9: 0
2. FEELING DOWN, DEPRESSED OR HOPELESS: NOT AT ALL
SUM OF ALL RESPONSES TO PHQ QUESTIONS 1-9: 0
1. LITTLE INTEREST OR PLEASURE IN DOING THINGS: NOT AT ALL
SUM OF ALL RESPONSES TO PHQ9 QUESTIONS 1 & 2: 0
SUM OF ALL RESPONSES TO PHQ QUESTIONS 1-9: 0
SUM OF ALL RESPONSES TO PHQ QUESTIONS 1-9: 0

## 2024-04-29 NOTE — PROGRESS NOTES
[facility]  Department of Family Medicine  Family Medicine Residency Program      Patient:  Ortega Cooper 68 y.o. male  Date of Service: 4/29/24      Chief complaint:   Chief Complaint   Patient presents with    Results     Review labs & AAA (care everywhere)   IMPRESSION:No evidence of abdominal aortic aneurysm       Diabetes    Hyperlipidemia    Urinary Retention     Was put on Flomax when he had an infection; but not currently taking. Wondering if he still needs this. On Oxybutin also but needs refill while he finds a new urologist  in [place].     Health Maintenance     Declined pneumo        Assessment and Plan   1. Mixed hyperlipidemia  Continue statin  - atorvastatin (LIPITOR) 40 MG tablet; Take 1 tablet by mouth daily TAKE 1 TABLET BY MOUTH ONE TIME A DAY  Dispense: 90 tablet; Refill: 3    2. Essential hypertension  Continue carvedilol lisinopril  - carvedilol (COREG) 6.25 MG tablet; Take 2 tablets by mouth 2 times daily  Dispense: 360 tablet; Refill: 3  - lisinopril (PRINIVIL;ZESTRIL) 20 MG tablet; Take 1 tablet by mouth daily  Dispense: 90 tablet; Refill: 3    3. Type 2 diabetes mellitus without complication, without long-term current use of insulin (HCC)  Continue metformin  - metFORMIN (GLUCOPHAGE-XR) 500 MG extended release tablet; Take 1 tablet by mouth one time daily  Dispense: 90 tablet; Refill: 3    4. Gastroesophageal reflux disease without esophagitis  Continue medical  - omeprazole (PRILOSEC) 40 MG delayed release capsule; Take 1 capsule by mouth every morning (before breakfast)  Dispense: 90 capsule; Refill: 3    5. Overactive bladder  Continue oxybutynin  - oxyBUTYnin (DITROPAN-XL) 5 MG extended release tablet; Take 1 tablet by mouth daily  Dispense: 90 tablet; Refill: 3    6. Pelvic pain  Will treat with anti-inflammatory and offer stretches.  Some stretches demonstrated today.  Handout provided that we will focus on lumbar spine and trochanteric region.  -

## 2024-05-10 ASSESSMENT — ENCOUNTER SYMPTOMS
CHEST TIGHTNESS: 0
SHORTNESS OF BREATH: 0
ABDOMINAL PAIN: 0

## 2024-06-27 RX ORDER — MELOXICAM 15 MG/1
TABLET ORAL
Qty: 30 TABLET | Refills: 0 | Status: SHIPPED | OUTPATIENT
Start: 2024-06-27

## 2024-06-27 NOTE — TELEPHONE ENCOUNTER
Last Appointment   4/29/2024  Next Appointment  Visit date not found      Return in about 6 months (around 10/29/2024) for Diabetes, High Blood Pressure.

## 2024-12-06 ENCOUNTER — TELEPHONE (OUTPATIENT)
Dept: FAMILY MEDICINE CLINIC | Age: 69
End: 2024-12-06

## 2025-04-24 LAB
ALBUMIN: NORMAL
ALP BLD-CCNC: NORMAL U/L
ALT SERPL-CCNC: NORMAL U/L
ANION GAP SERPL CALCULATED.3IONS-SCNC: NORMAL MMOL/L
AST SERPL-CCNC: NORMAL U/L
BASOPHILS ABSOLUTE: NORMAL
BASOPHILS RELATIVE PERCENT: NORMAL
BILIRUB SERPL-MCNC: NORMAL MG/DL
BUN BLDV-MCNC: NORMAL MG/DL
CALCIUM SERPL-MCNC: NORMAL MG/DL
CHLORIDE BLD-SCNC: NORMAL MMOL/L
CHOLESTEROL, TOTAL: NORMAL
CHOLESTEROL/HDL RATIO: NORMAL
CO2: NORMAL
CREAT SERPL-MCNC: NORMAL MG/DL
EOSINOPHILS ABSOLUTE: NORMAL
EOSINOPHILS RELATIVE PERCENT: NORMAL
ESTIMATED AVERAGE GLUCOSE: NORMAL
GFR, ESTIMATED: >60
GLUCOSE BLD-MCNC: NORMAL MG/DL
HBA1C MFR BLD: 5.7 %
HCT VFR BLD CALC: NORMAL %
HDLC SERPL-MCNC: NORMAL MG/DL
HEMOGLOBIN: NORMAL
LDL CHOLESTEROL: NORMAL
LYMPHOCYTES ABSOLUTE: NORMAL
LYMPHOCYTES RELATIVE PERCENT: NORMAL
MCH RBC QN AUTO: NORMAL PG
MCHC RBC AUTO-ENTMCNC: NORMAL G/DL
MCV RBC AUTO: NORMAL FL
MONOCYTES ABSOLUTE: NORMAL
MONOCYTES RELATIVE PERCENT: NORMAL
NEUTROPHILS ABSOLUTE: NORMAL
NEUTROPHILS RELATIVE PERCENT: NORMAL
NONHDLC SERPL-MCNC: NORMAL MG/DL
PLATELET # BLD: NORMAL 10*3/UL
PMV BLD AUTO: NORMAL FL
POTASSIUM SERPL-SCNC: NORMAL MMOL/L
RBC # BLD: NORMAL 10*6/UL
SODIUM BLD-SCNC: NORMAL MMOL/L
TOTAL PROTEIN: NORMAL
TRIGL SERPL-MCNC: NORMAL MG/DL
VLDLC SERPL CALC-MCNC: NORMAL MG/DL
WBC # BLD: NORMAL 10*3/UL

## 2025-05-09 ENCOUNTER — OFFICE VISIT (OUTPATIENT)
Dept: FAMILY MEDICINE CLINIC | Age: 70
End: 2025-05-09
Payer: COMMERCIAL

## 2025-05-09 VITALS
BODY MASS INDEX: 25.03 KG/M2 | OXYGEN SATURATION: 98 % | WEIGHT: 169 LBS | TEMPERATURE: 98.5 F | HEIGHT: 69 IN | RESPIRATION RATE: 18 BRPM | DIASTOLIC BLOOD PRESSURE: 79 MMHG | SYSTOLIC BLOOD PRESSURE: 133 MMHG | HEART RATE: 69 BPM

## 2025-05-09 DIAGNOSIS — E11.9 TYPE 2 DIABETES MELLITUS WITHOUT COMPLICATION, WITHOUT LONG-TERM CURRENT USE OF INSULIN (HCC): ICD-10-CM

## 2025-05-09 DIAGNOSIS — K21.9 GASTROESOPHAGEAL REFLUX DISEASE WITHOUT ESOPHAGITIS: ICD-10-CM

## 2025-05-09 DIAGNOSIS — Z00.00 ADULT WELLNESS VISIT: Primary | ICD-10-CM

## 2025-05-09 DIAGNOSIS — Z87.891 HISTORY OF SMOKING: ICD-10-CM

## 2025-05-09 DIAGNOSIS — E78.2 MIXED HYPERLIPIDEMIA: Chronic | ICD-10-CM

## 2025-05-09 DIAGNOSIS — I10 ESSENTIAL HYPERTENSION: Chronic | ICD-10-CM

## 2025-05-09 DIAGNOSIS — R10.2 PELVIC PAIN: ICD-10-CM

## 2025-05-09 LAB
ANION GAP SERPL CALCULATED.3IONS-SCNC: 10 MMOL/L (ref 7–16)
BUN BLDV-MCNC: 22 MG/DL (ref 8–23)
CALCIUM SERPL-MCNC: 9.5 MG/DL (ref 8.8–10.2)
CHLORIDE BLD-SCNC: 106 MMOL/L (ref 98–107)
CO2: 26 MMOL/L (ref 22–29)
CREAT SERPL-MCNC: 1 MG/DL (ref 0.7–1.2)
GFR, ESTIMATED: 81 ML/MIN/1.73M2
GLUCOSE BLD-MCNC: 124 MG/DL (ref 74–99)
POTASSIUM SERPL-SCNC: 5.3 MMOL/L (ref 3.5–5.1)
SODIUM BLD-SCNC: 142 MMOL/L (ref 136–145)

## 2025-05-09 PROCEDURE — 99397 PER PM REEVAL EST PAT 65+ YR: CPT | Performed by: FAMILY MEDICINE

## 2025-05-09 PROCEDURE — 3075F SYST BP GE 130 - 139MM HG: CPT | Performed by: FAMILY MEDICINE

## 2025-05-09 PROCEDURE — 3078F DIAST BP <80 MM HG: CPT | Performed by: FAMILY MEDICINE

## 2025-05-09 PROCEDURE — 36415 COLL VENOUS BLD VENIPUNCTURE: CPT | Performed by: FAMILY MEDICINE

## 2025-05-09 RX ORDER — OXYBUTYNIN CHLORIDE 5 MG/1
5 TABLET, EXTENDED RELEASE ORAL DAILY
Qty: 90 TABLET | Refills: 3 | Status: CANCELLED | OUTPATIENT
Start: 2025-05-09

## 2025-05-09 RX ORDER — OMEPRAZOLE 40 MG/1
40 CAPSULE, DELAYED RELEASE ORAL
Qty: 90 CAPSULE | Refills: 3 | Status: SHIPPED | OUTPATIENT
Start: 2025-05-09

## 2025-05-09 RX ORDER — ATORVASTATIN CALCIUM 40 MG/1
40 TABLET, FILM COATED ORAL DAILY
Qty: 90 TABLET | Refills: 3 | Status: SHIPPED | OUTPATIENT
Start: 2025-05-09

## 2025-05-09 RX ORDER — MELOXICAM 7.5 MG/1
7.5 TABLET ORAL DAILY
Qty: 90 TABLET | Refills: 3 | Status: SHIPPED | OUTPATIENT
Start: 2025-05-09

## 2025-05-09 RX ORDER — MELOXICAM 15 MG/1
15 TABLET ORAL DAILY
Qty: 30 TABLET | Refills: 1 | Status: CANCELLED | OUTPATIENT
Start: 2025-05-09

## 2025-05-09 RX ORDER — LISINOPRIL 20 MG/1
20 TABLET ORAL DAILY
Qty: 90 TABLET | Refills: 3 | Status: SHIPPED | OUTPATIENT
Start: 2025-05-09

## 2025-05-09 RX ORDER — CARVEDILOL 6.25 MG/1
12.5 TABLET ORAL 2 TIMES DAILY
Qty: 360 TABLET | Refills: 3 | Status: SHIPPED | OUTPATIENT
Start: 2025-05-09

## 2025-05-09 RX ORDER — METFORMIN HYDROCHLORIDE 500 MG/1
TABLET, EXTENDED RELEASE ORAL
Qty: 90 TABLET | Refills: 3 | Status: SHIPPED | OUTPATIENT
Start: 2025-05-09

## 2025-05-09 SDOH — ECONOMIC STABILITY: FOOD INSECURITY: WITHIN THE PAST 12 MONTHS, YOU WORRIED THAT YOUR FOOD WOULD RUN OUT BEFORE YOU GOT MONEY TO BUY MORE.: NEVER TRUE

## 2025-05-09 SDOH — ECONOMIC STABILITY: FOOD INSECURITY: WITHIN THE PAST 12 MONTHS, THE FOOD YOU BOUGHT JUST DIDN'T LAST AND YOU DIDN'T HAVE MONEY TO GET MORE.: NEVER TRUE

## 2025-05-09 ASSESSMENT — PATIENT HEALTH QUESTIONNAIRE - PHQ9
SUM OF ALL RESPONSES TO PHQ QUESTIONS 1-9: 0
SUM OF ALL RESPONSES TO PHQ QUESTIONS 1-9: 0
1. LITTLE INTEREST OR PLEASURE IN DOING THINGS: NOT AT ALL
2. FEELING DOWN, DEPRESSED OR HOPELESS: NOT AT ALL
SUM OF ALL RESPONSES TO PHQ QUESTIONS 1-9: 0
SUM OF ALL RESPONSES TO PHQ QUESTIONS 1-9: 0

## 2025-05-09 NOTE — PROGRESS NOTES
[facility]  Department of Family Medicine  Family Medicine Residency Program      Patient:  Ortega Cooper 69 y.o. male  Date of Service: 5/9/25      Chief complaint:   Chief Complaint   Patient presents with    Discuss Labs    Annual Exam       Assessment and Plan   1. Adult wellness visit  Reviewed recommended screenings    2. Mixed hyperlipidemia  Stable  - atorvastatin (LIPITOR) 40 MG tablet; Take 1 tablet by mouth daily TAKE 1 TABLET BY MOUTH ONE TIME A DAY  Dispense: 90 tablet; Refill: 3    3. Essential hypertension  Stable  - carvedilol (COREG) 6.25 MG tablet; Take 2 tablets by mouth 2 times daily  Dispense: 360 tablet; Refill: 3  - lisinopril (PRINIVIL;ZESTRIL) 20 MG tablet; Take 1 tablet by mouth daily  Dispense: 90 tablet; Refill: 3  - Basic Metabolic Panel    4. Type 2 diabetes mellitus without complication, without long-term current use of insulin (HCC)  Stable  - metFORMIN (GLUCOPHAGE-XR) 500 MG extended release tablet; Take 1 tablet by mouth one time daily  Dispense: 90 tablet; Refill: 3    5. Gastroesophageal reflux disease without esophagitis  Stable  - omeprazole (PRILOSEC) 40 MG delayed release capsule; Take 1 capsule by mouth every morning (before breakfast)  Dispense: 90 capsule; Refill: 3    6. History of smoking        Return to Office: Return in about 6 months (around 11/9/2025).    History ofPresent Illness   The patient is a 69 y.o. male  presented to the clinic with complaints as above.    Adult physical - no specific complaints.  Discussed recommended screenings.    HTN - no issues reported, no SE to meds. No CV sx reported.   HPL - no Se noted, no Cv sx reported.   DM - reports controlled numbers. Tolerating metformin.  A1C 5.7 - no sx noted.   GERD - taking PPI at least every other day - sx well controlled.   Since UTI resolved has not needed flomax.  No pelvic sx noted. No LUTS.     Knee pain - using meloxicam for this. Has helped knee pains substantially.

## 2025-05-12 ENCOUNTER — RESULTS FOLLOW-UP (OUTPATIENT)
Dept: FAMILY MEDICINE CLINIC | Age: 70
End: 2025-05-12

## 2025-05-12 ENCOUNTER — PATIENT MESSAGE (OUTPATIENT)
Dept: FAMILY MEDICINE CLINIC | Age: 70
End: 2025-05-12

## 2025-05-12 RX ORDER — ATORVASTATIN CALCIUM 40 MG/1
40 TABLET, FILM COATED ORAL DAILY
Qty: 90 TABLET | Refills: 3 | OUTPATIENT
Start: 2025-05-12

## 2025-05-12 RX ORDER — CARVEDILOL 6.25 MG/1
12.5 TABLET ORAL 2 TIMES DAILY
Qty: 360 TABLET | Refills: 3 | OUTPATIENT
Start: 2025-05-12

## 2025-05-12 RX ORDER — METFORMIN HYDROCHLORIDE 500 MG/1
500 TABLET, EXTENDED RELEASE ORAL DAILY
Qty: 90 TABLET | Refills: 3 | OUTPATIENT
Start: 2025-05-12

## 2025-05-12 RX ORDER — MELOXICAM 7.5 MG/1
7.5 TABLET ORAL DAILY
Qty: 90 TABLET | Refills: 3 | OUTPATIENT
Start: 2025-05-12

## 2025-05-14 DIAGNOSIS — E78.2 MIXED HYPERLIPIDEMIA: Chronic | ICD-10-CM

## 2025-05-14 DIAGNOSIS — I10 ESSENTIAL HYPERTENSION: Chronic | ICD-10-CM

## 2025-05-14 DIAGNOSIS — E11.9 TYPE 2 DIABETES MELLITUS WITHOUT COMPLICATION, WITHOUT LONG-TERM CURRENT USE OF INSULIN (HCC): ICD-10-CM

## 2025-05-14 DIAGNOSIS — Z12.5 SCREENING PSA (PROSTATE SPECIFIC ANTIGEN): ICD-10-CM

## 2025-09-03 ENCOUNTER — PATIENT MESSAGE (OUTPATIENT)
Dept: FAMILY MEDICINE CLINIC | Age: 70
End: 2025-09-03

## 2025-09-03 DIAGNOSIS — M25.519 SHOULDER PAIN, UNSPECIFIED CHRONICITY, UNSPECIFIED LATERALITY: Primary | ICD-10-CM
